# Patient Record
Sex: FEMALE | Race: BLACK OR AFRICAN AMERICAN | NOT HISPANIC OR LATINO | Employment: OTHER | URBAN - METROPOLITAN AREA
[De-identification: names, ages, dates, MRNs, and addresses within clinical notes are randomized per-mention and may not be internally consistent; named-entity substitution may affect disease eponyms.]

---

## 2022-07-21 ENCOUNTER — TRANSFERRED RECORDS (OUTPATIENT)
Dept: HEALTH INFORMATION MANAGEMENT | Facility: CLINIC | Age: 67
End: 2022-07-21

## 2022-09-06 ENCOUNTER — MEDICAL CORRESPONDENCE (OUTPATIENT)
Dept: HEALTH INFORMATION MANAGEMENT | Facility: CLINIC | Age: 67
End: 2022-09-06

## 2022-11-08 RX ORDER — LISINOPRIL 40 MG/1
40 TABLET ORAL DAILY
COMMUNITY

## 2022-11-08 RX ORDER — ALLOPURINOL 100 MG/1
100 TABLET ORAL DAILY
COMMUNITY

## 2022-11-08 RX ORDER — PREGABALIN 75 MG/1
75 CAPSULE ORAL DAILY
COMMUNITY

## 2022-11-08 RX ORDER — CHOLECALCIFEROL (VITAMIN D3) 50 MCG
1 TABLET ORAL DAILY
COMMUNITY

## 2022-11-08 RX ORDER — ATORVASTATIN CALCIUM 20 MG/1
20 TABLET, FILM COATED ORAL AT BEDTIME
COMMUNITY

## 2022-11-08 RX ORDER — AMLODIPINE BESYLATE 5 MG/1
5 TABLET ORAL DAILY
Status: ON HOLD | COMMUNITY
End: 2022-11-24

## 2022-11-08 RX ORDER — TRAZODONE HYDROCHLORIDE 100 MG/1
200 TABLET ORAL AT BEDTIME
COMMUNITY

## 2022-11-08 RX ORDER — METOPROLOL SUCCINATE 100 MG/1
100 TABLET, EXTENDED RELEASE ORAL 2 TIMES DAILY
COMMUNITY

## 2022-11-08 RX ORDER — BUSPIRONE HYDROCHLORIDE 10 MG/1
10 TABLET ORAL 3 TIMES DAILY
COMMUNITY

## 2022-11-08 RX ORDER — HYDROCHLOROTHIAZIDE 12.5 MG/1
12.5 TABLET ORAL DAILY
Status: ON HOLD | COMMUNITY
End: 2022-11-25

## 2022-11-08 RX ORDER — ESCITALOPRAM OXALATE 10 MG/1
10 TABLET ORAL DAILY
COMMUNITY

## 2022-11-14 ENCOUNTER — TRANSFERRED RECORDS (OUTPATIENT)
Dept: MULTI SPECIALTY CLINIC | Facility: CLINIC | Age: 67
End: 2022-11-14

## 2022-11-14 LAB
CREATININE (EXTERNAL): 1.4 MG/DL (ref 0.57–1.11)
GFR ESTIMATED (EXTERNAL): 41 ML/MIN/1.73M2
GLUCOSE (EXTERNAL): 164 MG/DL (ref 65–100)
HBA1C MFR BLD: 8 %
INR (EXTERNAL): 1.1
POTASSIUM (EXTERNAL): 3.8 MMOL/L (ref 3.5–5)

## 2022-11-18 ENCOUNTER — LAB (OUTPATIENT)
Dept: FAMILY MEDICINE | Facility: CLINIC | Age: 67
End: 2022-11-18
Payer: COMMERCIAL

## 2022-11-18 DIAGNOSIS — Z01.812 PRE-OPERATIVE LABORATORY EXAMINATION: ICD-10-CM

## 2022-11-18 LAB — SARS-COV-2 RNA RESP QL NAA+PROBE: NEGATIVE

## 2022-11-18 PROCEDURE — U0003 INFECTIOUS AGENT DETECTION BY NUCLEIC ACID (DNA OR RNA); SEVERE ACUTE RESPIRATORY SYNDROME CORONAVIRUS 2 (SARS-COV-2) (CORONAVIRUS DISEASE [COVID-19]), AMPLIFIED PROBE TECHNIQUE, MAKING USE OF HIGH THROUGHPUT TECHNOLOGIES AS DESCRIBED BY CMS-2020-01-R: HCPCS

## 2022-11-18 PROCEDURE — U0005 INFEC AGEN DETEC AMPLI PROBE: HCPCS

## 2022-11-22 ENCOUNTER — APPOINTMENT (OUTPATIENT)
Dept: GENERAL RADIOLOGY | Facility: CLINIC | Age: 67
DRG: 454 | End: 2022-11-22
Attending: NEUROLOGICAL SURGERY
Payer: COMMERCIAL

## 2022-11-22 ENCOUNTER — ANESTHESIA EVENT (OUTPATIENT)
Dept: SURGERY | Facility: CLINIC | Age: 67
DRG: 454 | End: 2022-11-22
Payer: COMMERCIAL

## 2022-11-22 ENCOUNTER — HOSPITAL ENCOUNTER (INPATIENT)
Facility: CLINIC | Age: 67
LOS: 3 days | Discharge: HOME | DRG: 454 | End: 2022-11-25
Attending: NEUROLOGICAL SURGERY | Admitting: NEUROLOGICAL SURGERY
Payer: COMMERCIAL

## 2022-11-22 ENCOUNTER — ANESTHESIA (OUTPATIENT)
Dept: SURGERY | Facility: CLINIC | Age: 67
DRG: 454 | End: 2022-11-22
Payer: COMMERCIAL

## 2022-11-22 ENCOUNTER — APPOINTMENT (OUTPATIENT)
Dept: PHYSICAL THERAPY | Facility: CLINIC | Age: 67
DRG: 454 | End: 2022-11-22
Attending: NEUROLOGICAL SURGERY
Payer: COMMERCIAL

## 2022-11-22 DIAGNOSIS — M43.26 FUSION OF LUMBAR SPINE: Primary | ICD-10-CM

## 2022-11-22 PROBLEM — M43.20 FUSION OF SPINE, SITE UNSPECIFIED: Status: ACTIVE | Noted: 2022-11-22

## 2022-11-22 LAB
ANION GAP SERPL CALCULATED.3IONS-SCNC: 10 MMOL/L (ref 7–15)
BUN SERPL-MCNC: 19.8 MG/DL (ref 8–23)
CALCIUM SERPL-MCNC: 8.4 MG/DL (ref 8.8–10.2)
CHLORIDE SERPL-SCNC: 99 MMOL/L (ref 98–107)
CREAT SERPL-MCNC: 1.2 MG/DL (ref 0.51–0.95)
DEPRECATED HCO3 PLAS-SCNC: 27 MMOL/L (ref 22–29)
GFR SERPL CREATININE-BSD FRML MDRD: 49 ML/MIN/1.73M2
GLUCOSE BLDC GLUCOMTR-MCNC: 157 MG/DL (ref 70–99)
GLUCOSE BLDC GLUCOMTR-MCNC: 206 MG/DL (ref 70–99)
GLUCOSE BLDC GLUCOMTR-MCNC: 209 MG/DL (ref 70–99)
GLUCOSE BLDC GLUCOMTR-MCNC: 216 MG/DL (ref 70–99)
GLUCOSE BLDC GLUCOMTR-MCNC: 349 MG/DL (ref 70–99)
GLUCOSE SERPL-MCNC: 215 MG/DL (ref 70–99)
HBA1C MFR BLD: 7.3 %
POTASSIUM SERPL-SCNC: 4.1 MMOL/L (ref 3.4–5.3)
SODIUM SERPL-SCNC: 136 MMOL/L (ref 136–145)

## 2022-11-22 PROCEDURE — 01NB3ZZ RELEASE LUMBAR NERVE, PERCUTANEOUS APPROACH: ICD-10-PCS | Performed by: NEUROLOGICAL SURGERY

## 2022-11-22 PROCEDURE — 36415 COLL VENOUS BLD VENIPUNCTURE: CPT | Performed by: PHYSICIAN ASSISTANT

## 2022-11-22 PROCEDURE — 80048 BASIC METABOLIC PNL TOTAL CA: CPT | Performed by: PHYSICIAN ASSISTANT

## 2022-11-22 PROCEDURE — 0SG1071 FUSION OF 2 OR MORE LUMBAR VERTEBRAL JOINTS WITH AUTOLOGOUS TISSUE SUBSTITUTE, POSTERIOR APPROACH, POSTERIOR COLUMN, OPEN APPROACH: ICD-10-PCS | Performed by: NEUROLOGICAL SURGERY

## 2022-11-22 PROCEDURE — 00NY3ZZ RELEASE LUMBAR SPINAL CORD, PERCUTANEOUS APPROACH: ICD-10-PCS | Performed by: NEUROLOGICAL SURGERY

## 2022-11-22 PROCEDURE — 272N000001 HC OR GENERAL SUPPLY STERILE: Performed by: NEUROLOGICAL SURGERY

## 2022-11-22 PROCEDURE — 0SG13A0 FUSION OF 2 OR MORE LUMBAR VERTEBRAL JOINTS WITH INTERBODY FUSION DEVICE, ANTERIOR APPROACH, ANTERIOR COLUMN, PERCUTANEOUS APPROACH: ICD-10-PCS | Performed by: NEUROLOGICAL SURGERY

## 2022-11-22 PROCEDURE — 370N000017 HC ANESTHESIA TECHNICAL FEE, PER MIN: Performed by: NEUROLOGICAL SURGERY

## 2022-11-22 PROCEDURE — 258N000003 HC RX IP 258 OP 636: Performed by: ANESTHESIOLOGY

## 2022-11-22 PROCEDURE — 120N000001 HC R&B MED SURG/OB

## 2022-11-22 PROCEDURE — 0SB23ZZ EXCISION OF LUMBAR VERTEBRAL DISC, PERCUTANEOUS APPROACH: ICD-10-PCS | Performed by: NEUROLOGICAL SURGERY

## 2022-11-22 PROCEDURE — 250N000013 HC RX MED GY IP 250 OP 250 PS 637: Performed by: PHYSICIAN ASSISTANT

## 2022-11-22 PROCEDURE — 250N000013 HC RX MED GY IP 250 OP 250 PS 637: Performed by: NEUROLOGICAL SURGERY

## 2022-11-22 PROCEDURE — C1713 ANCHOR/SCREW BN/BN,TIS/BN: HCPCS | Performed by: NEUROLOGICAL SURGERY

## 2022-11-22 PROCEDURE — 258N000003 HC RX IP 258 OP 636: Performed by: NEUROLOGICAL SURGERY

## 2022-11-22 PROCEDURE — 272N000002 HC OR SUPPLY OTHER OPNP: Performed by: NEUROLOGICAL SURGERY

## 2022-11-22 PROCEDURE — 250N000009 HC RX 250

## 2022-11-22 PROCEDURE — 250N000011 HC RX IP 250 OP 636: Performed by: NEUROLOGICAL SURGERY

## 2022-11-22 PROCEDURE — 97161 PT EVAL LOW COMPLEX 20 MIN: CPT | Mod: GP

## 2022-11-22 PROCEDURE — 99223 1ST HOSP IP/OBS HIGH 75: CPT | Performed by: PHYSICIAN ASSISTANT

## 2022-11-22 PROCEDURE — 250N000009 HC RX 250: Performed by: NEUROLOGICAL SURGERY

## 2022-11-22 PROCEDURE — 250N000005 HC OR RX SURGIFLO HEMOSTATIC MATRIX 10ML 199102S OPNP: Performed by: NEUROLOGICAL SURGERY

## 2022-11-22 PROCEDURE — 710N000009 HC RECOVERY PHASE 1, LEVEL 1, PER MIN: Performed by: NEUROLOGICAL SURGERY

## 2022-11-22 PROCEDURE — 999N000179 XR SURGERY CARM FLUORO LESS THAN 5 MIN W STILLS: Mod: TC

## 2022-11-22 PROCEDURE — 360N000084 HC SURGERY LEVEL 4 W/ FLUORO, PER MIN: Performed by: NEUROLOGICAL SURGERY

## 2022-11-22 PROCEDURE — 4A11X4G MONITORING OF PERIPHERAL NERVOUS ELECTRICAL ACTIVITY, INTRAOPERATIVE, EXTERNAL APPROACH: ICD-10-PCS | Performed by: NEUROLOGICAL SURGERY

## 2022-11-22 PROCEDURE — 83036 HEMOGLOBIN GLYCOSYLATED A1C: CPT | Performed by: PHYSICIAN ASSISTANT

## 2022-11-22 PROCEDURE — 97530 THERAPEUTIC ACTIVITIES: CPT | Mod: GP

## 2022-11-22 PROCEDURE — 272N000282 HC OR IOM SUPPLIES OPNP: Performed by: NEUROLOGICAL SURGERY

## 2022-11-22 PROCEDURE — 250N000012 HC RX MED GY IP 250 OP 636 PS 637: Performed by: PHYSICIAN ASSISTANT

## 2022-11-22 PROCEDURE — 250N000011 HC RX IP 250 OP 636: Performed by: ANESTHESIOLOGY

## 2022-11-22 PROCEDURE — 0SP004Z REMOVAL OF INTERNAL FIXATION DEVICE FROM LUMBAR VERTEBRAL JOINT, OPEN APPROACH: ICD-10-PCS | Performed by: NEUROLOGICAL SURGERY

## 2022-11-22 PROCEDURE — 999N000141 HC STATISTIC PRE-PROCEDURE NURSING ASSESSMENT: Performed by: NEUROLOGICAL SURGERY

## 2022-11-22 PROCEDURE — 250N000011 HC RX IP 250 OP 636

## 2022-11-22 DEVICE — IMPLANTABLE DEVICE: Type: IMPLANTABLE DEVICE | Site: SPINE LUMBAR | Status: FUNCTIONAL

## 2022-11-22 DEVICE — SET SCREW, STAR TYPE (OPEN TYPE)
Type: IMPLANTABLE DEVICE | Site: SPINE LUMBAR | Status: FUNCTIONAL
Brand: LNK/PATHLOC-L MIS SPINAL SYSTEM

## 2022-11-22 DEVICE — SCREW BN 50MM 7.5MM ST CLS 2 THRD 127MM NS PATHLOC-L SPNE LF: Type: IMPLANTABLE DEVICE | Site: SPINE LUMBAR | Status: FUNCTIONAL

## 2022-11-22 RX ORDER — ONDANSETRON 2 MG/ML
4 INJECTION INTRAMUSCULAR; INTRAVENOUS EVERY 6 HOURS PRN
Status: DISCONTINUED | OUTPATIENT
Start: 2022-11-22 | End: 2022-11-25 | Stop reason: HOSPADM

## 2022-11-22 RX ORDER — FENTANYL CITRATE 50 UG/ML
25 INJECTION, SOLUTION INTRAMUSCULAR; INTRAVENOUS EVERY 5 MIN PRN
Status: DISCONTINUED | OUTPATIENT
Start: 2022-11-22 | End: 2022-11-22 | Stop reason: HOSPADM

## 2022-11-22 RX ORDER — DEXTROSE MONOHYDRATE 25 G/50ML
25-50 INJECTION, SOLUTION INTRAVENOUS
Status: DISCONTINUED | OUTPATIENT
Start: 2022-11-22 | End: 2022-11-25 | Stop reason: HOSPADM

## 2022-11-22 RX ORDER — PROCHLORPERAZINE MALEATE 5 MG
5 TABLET ORAL EVERY 6 HOURS PRN
Status: DISCONTINUED | OUTPATIENT
Start: 2022-11-22 | End: 2022-11-25 | Stop reason: HOSPADM

## 2022-11-22 RX ORDER — GABAPENTIN 100 MG/1
100 CAPSULE ORAL
Status: COMPLETED | OUTPATIENT
Start: 2022-11-22 | End: 2022-11-22

## 2022-11-22 RX ORDER — BUPIVACAINE HYDROCHLORIDE 7.5 MG/ML
INJECTION, SOLUTION EPIDURAL; RETROBULBAR PRN
Status: DISCONTINUED | OUTPATIENT
Start: 2022-11-22 | End: 2022-11-22 | Stop reason: HOSPADM

## 2022-11-22 RX ORDER — ACETAMINOPHEN 325 MG/1
650 TABLET ORAL EVERY 4 HOURS PRN
Status: DISCONTINUED | OUTPATIENT
Start: 2022-11-25 | End: 2022-11-25 | Stop reason: HOSPADM

## 2022-11-22 RX ORDER — CEFAZOLIN SODIUM/WATER 2 G/20 ML
2 SYRINGE (ML) INTRAVENOUS SEE ADMIN INSTRUCTIONS
Status: DISCONTINUED | OUTPATIENT
Start: 2022-11-22 | End: 2022-11-22 | Stop reason: HOSPADM

## 2022-11-22 RX ORDER — ONDANSETRON 2 MG/ML
4 INJECTION INTRAMUSCULAR; INTRAVENOUS EVERY 30 MIN PRN
Status: DISCONTINUED | OUTPATIENT
Start: 2022-11-22 | End: 2022-11-22 | Stop reason: HOSPADM

## 2022-11-22 RX ORDER — CEFAZOLIN SODIUM/WATER 2 G/20 ML
2 SYRINGE (ML) INTRAVENOUS
Status: COMPLETED | OUTPATIENT
Start: 2022-11-22 | End: 2022-11-22

## 2022-11-22 RX ORDER — CEFAZOLIN SODIUM 2 G/100ML
2 INJECTION, SOLUTION INTRAVENOUS EVERY 8 HOURS
Status: COMPLETED | OUTPATIENT
Start: 2022-11-22 | End: 2022-11-23

## 2022-11-22 RX ORDER — ALLOPURINOL 100 MG/1
100 TABLET ORAL DAILY
Status: DISCONTINUED | OUTPATIENT
Start: 2022-11-23 | End: 2022-11-25 | Stop reason: HOSPADM

## 2022-11-22 RX ORDER — DEXAMETHASONE SODIUM PHOSPHATE 4 MG/ML
INJECTION, SOLUTION INTRA-ARTICULAR; INTRALESIONAL; INTRAMUSCULAR; INTRAVENOUS; SOFT TISSUE PRN
Status: DISCONTINUED | OUTPATIENT
Start: 2022-11-22 | End: 2022-11-22

## 2022-11-22 RX ORDER — HYDROMORPHONE HYDROCHLORIDE 1 MG/ML
0.5 INJECTION, SOLUTION INTRAMUSCULAR; INTRAVENOUS; SUBCUTANEOUS
Status: DISCONTINUED | OUTPATIENT
Start: 2022-11-22 | End: 2022-11-25 | Stop reason: HOSPADM

## 2022-11-22 RX ORDER — PROPOFOL 10 MG/ML
INJECTION, EMULSION INTRAVENOUS PRN
Status: DISCONTINUED | OUTPATIENT
Start: 2022-11-22 | End: 2022-11-22

## 2022-11-22 RX ORDER — SODIUM CHLORIDE, SODIUM LACTATE, POTASSIUM CHLORIDE, CALCIUM CHLORIDE 600; 310; 30; 20 MG/100ML; MG/100ML; MG/100ML; MG/100ML
INJECTION, SOLUTION INTRAVENOUS CONTINUOUS
Status: DISCONTINUED | OUTPATIENT
Start: 2022-11-22 | End: 2022-11-22 | Stop reason: HOSPADM

## 2022-11-22 RX ORDER — OXYCODONE HYDROCHLORIDE 5 MG/1
10 TABLET ORAL EVERY 4 HOURS PRN
Status: DISCONTINUED | OUTPATIENT
Start: 2022-11-22 | End: 2022-11-25 | Stop reason: HOSPADM

## 2022-11-22 RX ORDER — LISINOPRIL 40 MG/1
40 TABLET ORAL DAILY
Status: DISCONTINUED | OUTPATIENT
Start: 2022-11-23 | End: 2022-11-24

## 2022-11-22 RX ORDER — BISACODYL 10 MG
10 SUPPOSITORY, RECTAL RECTAL DAILY PRN
Status: DISCONTINUED | OUTPATIENT
Start: 2022-11-22 | End: 2022-11-25 | Stop reason: HOSPADM

## 2022-11-22 RX ORDER — HYDROMORPHONE HCL IN WATER/PF 6 MG/30 ML
0.4 PATIENT CONTROLLED ANALGESIA SYRINGE INTRAVENOUS EVERY 5 MIN PRN
Status: DISCONTINUED | OUTPATIENT
Start: 2022-11-22 | End: 2022-11-22 | Stop reason: HOSPADM

## 2022-11-22 RX ORDER — NALOXONE HYDROCHLORIDE 0.4 MG/ML
0.4 INJECTION, SOLUTION INTRAMUSCULAR; INTRAVENOUS; SUBCUTANEOUS
Status: DISCONTINUED | OUTPATIENT
Start: 2022-11-22 | End: 2022-11-25 | Stop reason: HOSPADM

## 2022-11-22 RX ORDER — ATORVASTATIN CALCIUM 20 MG/1
20 TABLET, FILM COATED ORAL AT BEDTIME
Status: DISCONTINUED | OUTPATIENT
Start: 2022-11-22 | End: 2022-11-25 | Stop reason: HOSPADM

## 2022-11-22 RX ORDER — AMOXICILLIN 250 MG
1 CAPSULE ORAL 2 TIMES DAILY
Status: DISCONTINUED | OUTPATIENT
Start: 2022-11-22 | End: 2022-11-25 | Stop reason: HOSPADM

## 2022-11-22 RX ORDER — FENTANYL CITRATE 50 UG/ML
50 INJECTION, SOLUTION INTRAMUSCULAR; INTRAVENOUS EVERY 5 MIN PRN
Status: DISCONTINUED | OUTPATIENT
Start: 2022-11-22 | End: 2022-11-22 | Stop reason: HOSPADM

## 2022-11-22 RX ORDER — HYDRALAZINE HYDROCHLORIDE 20 MG/ML
INJECTION INTRAMUSCULAR; INTRAVENOUS PRN
Status: DISCONTINUED | OUTPATIENT
Start: 2022-11-22 | End: 2022-11-22

## 2022-11-22 RX ORDER — KETOROLAC TROMETHAMINE 15 MG/ML
15 INJECTION, SOLUTION INTRAMUSCULAR; INTRAVENOUS EVERY 6 HOURS
Status: DISCONTINUED | OUTPATIENT
Start: 2022-11-22 | End: 2022-11-24

## 2022-11-22 RX ORDER — NALOXONE HYDROCHLORIDE 0.4 MG/ML
0.2 INJECTION, SOLUTION INTRAMUSCULAR; INTRAVENOUS; SUBCUTANEOUS
Status: DISCONTINUED | OUTPATIENT
Start: 2022-11-22 | End: 2022-11-25 | Stop reason: HOSPADM

## 2022-11-22 RX ORDER — LIDOCAINE HYDROCHLORIDE 10 MG/ML
INJECTION, SOLUTION INFILTRATION; PERINEURAL PRN
Status: DISCONTINUED | OUTPATIENT
Start: 2022-11-22 | End: 2022-11-22

## 2022-11-22 RX ORDER — GLYCOPYRROLATE 0.2 MG/ML
INJECTION, SOLUTION INTRAMUSCULAR; INTRAVENOUS PRN
Status: DISCONTINUED | OUTPATIENT
Start: 2022-11-22 | End: 2022-11-22

## 2022-11-22 RX ORDER — LABETALOL HYDROCHLORIDE 5 MG/ML
10 INJECTION, SOLUTION INTRAVENOUS
Status: DISCONTINUED | OUTPATIENT
Start: 2022-11-22 | End: 2022-11-22 | Stop reason: HOSPADM

## 2022-11-22 RX ORDER — SODIUM CHLORIDE 9 MG/ML
INJECTION, SOLUTION INTRAVENOUS CONTINUOUS
Status: DISCONTINUED | OUTPATIENT
Start: 2022-11-22 | End: 2022-11-23

## 2022-11-22 RX ORDER — CLONIDINE HYDROCHLORIDE 0.1 MG/1
1 TABLET ORAL 3 TIMES DAILY
Status: ON HOLD | COMMUNITY
End: 2022-11-24

## 2022-11-22 RX ORDER — PREGABALIN 75 MG/1
75 CAPSULE ORAL
Status: DISCONTINUED | OUTPATIENT
Start: 2022-11-22 | End: 2022-11-23

## 2022-11-22 RX ORDER — KETAMINE HYDROCHLORIDE 10 MG/ML
INJECTION INTRAMUSCULAR; INTRAVENOUS PRN
Status: DISCONTINUED | OUTPATIENT
Start: 2022-11-22 | End: 2022-11-22

## 2022-11-22 RX ORDER — CLONIDINE HYDROCHLORIDE 0.1 MG/1
0.1 TABLET ORAL 3 TIMES DAILY
Status: DISCONTINUED | OUTPATIENT
Start: 2022-11-22 | End: 2022-11-24

## 2022-11-22 RX ORDER — HYDROMORPHONE HCL IN WATER/PF 6 MG/30 ML
0.2 PATIENT CONTROLLED ANALGESIA SYRINGE INTRAVENOUS EVERY 5 MIN PRN
Status: DISCONTINUED | OUTPATIENT
Start: 2022-11-22 | End: 2022-11-22 | Stop reason: HOSPADM

## 2022-11-22 RX ORDER — POLYETHYLENE GLYCOL 3350 17 G/17G
17 POWDER, FOR SOLUTION ORAL DAILY
Status: DISCONTINUED | OUTPATIENT
Start: 2022-11-23 | End: 2022-11-25 | Stop reason: HOSPADM

## 2022-11-22 RX ORDER — LIDOCAINE 40 MG/G
CREAM TOPICAL
Status: DISCONTINUED | OUTPATIENT
Start: 2022-11-22 | End: 2022-11-22 | Stop reason: HOSPADM

## 2022-11-22 RX ORDER — ACETAMINOPHEN 325 MG/1
975 TABLET ORAL EVERY 8 HOURS
Status: COMPLETED | OUTPATIENT
Start: 2022-11-22 | End: 2022-11-25

## 2022-11-22 RX ORDER — NICOTINE POLACRILEX 4 MG
15-30 LOZENGE BUCCAL
Status: DISCONTINUED | OUTPATIENT
Start: 2022-11-22 | End: 2022-11-25 | Stop reason: HOSPADM

## 2022-11-22 RX ORDER — ONDANSETRON 2 MG/ML
INJECTION INTRAMUSCULAR; INTRAVENOUS PRN
Status: DISCONTINUED | OUTPATIENT
Start: 2022-11-22 | End: 2022-11-22

## 2022-11-22 RX ORDER — HYDROCHLOROTHIAZIDE 12.5 MG/1
12.5 CAPSULE ORAL DAILY
Status: DISCONTINUED | OUTPATIENT
Start: 2022-11-23 | End: 2022-11-24

## 2022-11-22 RX ORDER — TRAZODONE HYDROCHLORIDE 100 MG/1
200 TABLET ORAL AT BEDTIME
Status: DISCONTINUED | OUTPATIENT
Start: 2022-11-22 | End: 2022-11-25 | Stop reason: HOSPADM

## 2022-11-22 RX ORDER — ONDANSETRON 4 MG/1
4 TABLET, ORALLY DISINTEGRATING ORAL EVERY 6 HOURS PRN
Status: DISCONTINUED | OUTPATIENT
Start: 2022-11-22 | End: 2022-11-25 | Stop reason: HOSPADM

## 2022-11-22 RX ORDER — OXYCODONE HYDROCHLORIDE 5 MG/1
5 TABLET ORAL EVERY 4 HOURS PRN
Status: DISCONTINUED | OUTPATIENT
Start: 2022-11-22 | End: 2022-11-25 | Stop reason: HOSPADM

## 2022-11-22 RX ORDER — HYDROXYZINE HYDROCHLORIDE 10 MG/1
10 TABLET, FILM COATED ORAL EVERY 6 HOURS
Qty: 60 TABLET | Refills: 3 | Status: SHIPPED | OUTPATIENT
Start: 2022-11-22

## 2022-11-22 RX ORDER — LIDOCAINE 40 MG/G
CREAM TOPICAL
Status: DISCONTINUED | OUTPATIENT
Start: 2022-11-22 | End: 2022-11-25 | Stop reason: HOSPADM

## 2022-11-22 RX ORDER — METHOCARBAMOL 750 MG/1
750 TABLET, FILM COATED ORAL EVERY 6 HOURS PRN
Status: DISCONTINUED | OUTPATIENT
Start: 2022-11-22 | End: 2022-11-25 | Stop reason: HOSPADM

## 2022-11-22 RX ORDER — AMLODIPINE BESYLATE 5 MG/1
5 TABLET ORAL DAILY
Status: DISCONTINUED | OUTPATIENT
Start: 2022-11-23 | End: 2022-11-24

## 2022-11-22 RX ORDER — OXYCODONE HYDROCHLORIDE 10 MG/1
10 TABLET ORAL EVERY 4 HOURS PRN
Qty: 50 TABLET | Refills: 0 | Status: SHIPPED | OUTPATIENT
Start: 2022-11-22

## 2022-11-22 RX ORDER — METOPROLOL SUCCINATE 100 MG/1
100 TABLET, EXTENDED RELEASE ORAL 2 TIMES DAILY
Status: DISCONTINUED | OUTPATIENT
Start: 2022-11-22 | End: 2022-11-25 | Stop reason: HOSPADM

## 2022-11-22 RX ORDER — HYDROMORPHONE HCL IN WATER/PF 6 MG/30 ML
0.2 PATIENT CONTROLLED ANALGESIA SYRINGE INTRAVENOUS
Status: DISCONTINUED | OUTPATIENT
Start: 2022-11-22 | End: 2022-11-25 | Stop reason: HOSPADM

## 2022-11-22 RX ORDER — METHOCARBAMOL 500 MG/1
500 TABLET, FILM COATED ORAL 4 TIMES DAILY
Status: DISCONTINUED | OUTPATIENT
Start: 2022-11-22 | End: 2022-11-23

## 2022-11-22 RX ORDER — DEXMEDETOMIDINE HYDROCHLORIDE 4 UG/ML
INJECTION, SOLUTION INTRAVENOUS CONTINUOUS PRN
Status: DISCONTINUED | OUTPATIENT
Start: 2022-11-22 | End: 2022-11-22

## 2022-11-22 RX ORDER — BUSPIRONE HYDROCHLORIDE 10 MG/1
10 TABLET ORAL 3 TIMES DAILY
Status: DISCONTINUED | OUTPATIENT
Start: 2022-11-22 | End: 2022-11-25 | Stop reason: HOSPADM

## 2022-11-22 RX ORDER — HYDROXYZINE HYDROCHLORIDE 10 MG/1
10 TABLET, FILM COATED ORAL EVERY 6 HOURS
Status: DISCONTINUED | OUTPATIENT
Start: 2022-11-22 | End: 2022-11-25 | Stop reason: HOSPADM

## 2022-11-22 RX ORDER — FENTANYL CITRATE 50 UG/ML
INJECTION, SOLUTION INTRAMUSCULAR; INTRAVENOUS PRN
Status: DISCONTINUED | OUTPATIENT
Start: 2022-11-22 | End: 2022-11-22

## 2022-11-22 RX ORDER — ONDANSETRON 4 MG/1
4 TABLET, ORALLY DISINTEGRATING ORAL EVERY 30 MIN PRN
Status: DISCONTINUED | OUTPATIENT
Start: 2022-11-22 | End: 2022-11-22 | Stop reason: HOSPADM

## 2022-11-22 RX ORDER — ESCITALOPRAM OXALATE 10 MG/1
10 TABLET ORAL DAILY
Status: DISCONTINUED | OUTPATIENT
Start: 2022-11-23 | End: 2022-11-25 | Stop reason: HOSPADM

## 2022-11-22 RX ADMIN — ATORVASTATIN CALCIUM 20 MG: 20 TABLET, FILM COATED ORAL at 22:15

## 2022-11-22 RX ADMIN — BUSPIRONE HYDROCHLORIDE 10 MG: 10 TABLET ORAL at 20:50

## 2022-11-22 RX ADMIN — HYDROMORPHONE HYDROCHLORIDE 1 MG: 1 INJECTION, SOLUTION INTRAMUSCULAR; INTRAVENOUS; SUBCUTANEOUS at 08:28

## 2022-11-22 RX ADMIN — INSULIN ASPART 2 UNITS: 100 INJECTION, SOLUTION INTRAVENOUS; SUBCUTANEOUS at 17:49

## 2022-11-22 RX ADMIN — GABAPENTIN 100 MG: 100 CAPSULE ORAL at 06:24

## 2022-11-22 RX ADMIN — KETOROLAC TROMETHAMINE 15 MG: 15 INJECTION, SOLUTION INTRAMUSCULAR; INTRAVENOUS at 17:15

## 2022-11-22 RX ADMIN — Medication 80 MG: at 07:48

## 2022-11-22 RX ADMIN — ACETAMINOPHEN 975 MG: 325 TABLET, FILM COATED ORAL at 11:28

## 2022-11-22 RX ADMIN — Medication 50 MG: at 08:15

## 2022-11-22 RX ADMIN — FENTANYL CITRATE 50 MCG: 50 INJECTION, SOLUTION INTRAMUSCULAR; INTRAVENOUS at 11:36

## 2022-11-22 RX ADMIN — HYDRALAZINE HYDROCHLORIDE 5 MG: 20 INJECTION INTRAMUSCULAR; INTRAVENOUS at 09:06

## 2022-11-22 RX ADMIN — PROPOFOL 50 MCG/KG/MIN: 10 INJECTION, EMULSION INTRAVENOUS at 08:15

## 2022-11-22 RX ADMIN — FENTANYL CITRATE 50 MCG: 50 INJECTION, SOLUTION INTRAMUSCULAR; INTRAVENOUS at 08:35

## 2022-11-22 RX ADMIN — FENTANYL CITRATE 100 MCG: 50 INJECTION, SOLUTION INTRAMUSCULAR; INTRAVENOUS at 07:48

## 2022-11-22 RX ADMIN — ACETAMINOPHEN 975 MG: 325 TABLET, FILM COATED ORAL at 19:19

## 2022-11-22 RX ADMIN — OXYCODONE HYDROCHLORIDE 5 MG: 5 TABLET ORAL at 11:47

## 2022-11-22 RX ADMIN — CLONIDINE HYDROCHLORIDE 0.1 MG: 0.1 TABLET ORAL at 20:50

## 2022-11-22 RX ADMIN — CEFAZOLIN SODIUM 2 G: 2 INJECTION, SOLUTION INTRAVENOUS at 17:15

## 2022-11-22 RX ADMIN — HYDROMORPHONE HYDROCHLORIDE 0.2 MG: 0.2 INJECTION, SOLUTION INTRAMUSCULAR; INTRAVENOUS; SUBCUTANEOUS at 12:48

## 2022-11-22 RX ADMIN — FENTANYL CITRATE 25 MCG: 50 INJECTION, SOLUTION INTRAMUSCULAR; INTRAVENOUS at 11:54

## 2022-11-22 RX ADMIN — Medication 2 G: at 07:39

## 2022-11-22 RX ADMIN — DEXAMETHASONE SODIUM PHOSPHATE 8 MG: 4 INJECTION, SOLUTION INTRA-ARTICULAR; INTRALESIONAL; INTRAMUSCULAR; INTRAVENOUS; SOFT TISSUE at 07:53

## 2022-11-22 RX ADMIN — GLYCOPYRROLATE 0.2 MG: 0.2 INJECTION, SOLUTION INTRAMUSCULAR; INTRAVENOUS at 08:00

## 2022-11-22 RX ADMIN — TRAZODONE HYDROCHLORIDE 200 MG: 100 TABLET ORAL at 22:15

## 2022-11-22 RX ADMIN — KETOROLAC TROMETHAMINE 15 MG: 15 INJECTION, SOLUTION INTRAMUSCULAR; INTRAVENOUS at 23:00

## 2022-11-22 RX ADMIN — ONDANSETRON HYDROCHLORIDE 4 MG: 2 INJECTION, SOLUTION INTRAVENOUS at 09:45

## 2022-11-22 RX ADMIN — Medication 0.5 MCG/KG/HR: at 08:15

## 2022-11-22 RX ADMIN — METOPROLOL SUCCINATE 100 MG: 100 TABLET, EXTENDED RELEASE ORAL at 20:50

## 2022-11-22 RX ADMIN — FENTANYL CITRATE 50 MCG: 50 INJECTION, SOLUTION INTRAMUSCULAR; INTRAVENOUS at 10:40

## 2022-11-22 RX ADMIN — FENTANYL CITRATE 25 MCG: 50 INJECTION, SOLUTION INTRAMUSCULAR; INTRAVENOUS at 12:04

## 2022-11-22 RX ADMIN — HYDROXYZINE HYDROCHLORIDE 10 MG: 10 TABLET ORAL at 22:59

## 2022-11-22 RX ADMIN — PROPOFOL 30 MG: 10 INJECTION, EMULSION INTRAVENOUS at 07:53

## 2022-11-22 RX ADMIN — HYDROXYZINE HYDROCHLORIDE 10 MG: 10 TABLET ORAL at 11:47

## 2022-11-22 RX ADMIN — OXYCODONE HYDROCHLORIDE 10 MG: 5 TABLET ORAL at 20:53

## 2022-11-22 RX ADMIN — SODIUM CHLORIDE: 9 INJECTION, SOLUTION INTRAVENOUS at 17:14

## 2022-11-22 RX ADMIN — PROPOFOL 120 MG: 10 INJECTION, EMULSION INTRAVENOUS at 07:48

## 2022-11-22 RX ADMIN — FENTANYL CITRATE 50 MCG: 50 INJECTION, SOLUTION INTRAMUSCULAR; INTRAVENOUS at 09:00

## 2022-11-22 RX ADMIN — PROPOFOL 50 MG: 10 INJECTION, EMULSION INTRAVENOUS at 08:32

## 2022-11-22 RX ADMIN — SENNOSIDES AND DOCUSATE SODIUM 1 TABLET: 50; 8.6 TABLET ORAL at 20:50

## 2022-11-22 RX ADMIN — SODIUM CHLORIDE, POTASSIUM CHLORIDE, SODIUM LACTATE AND CALCIUM CHLORIDE: 600; 310; 30; 20 INJECTION, SOLUTION INTRAVENOUS at 07:39

## 2022-11-22 RX ADMIN — METHOCARBAMOL 500 MG: 500 TABLET ORAL at 19:18

## 2022-11-22 RX ADMIN — FENTANYL CITRATE 50 MCG: 50 INJECTION, SOLUTION INTRAMUSCULAR; INTRAVENOUS at 11:14

## 2022-11-22 RX ADMIN — LIDOCAINE HYDROCHLORIDE 50 MG: 10 INJECTION, SOLUTION INFILTRATION; PERINEURAL at 07:48

## 2022-11-22 RX ADMIN — HYDROXYZINE HYDROCHLORIDE 10 MG: 10 TABLET ORAL at 17:15

## 2022-11-22 RX ADMIN — HYDROMORPHONE HYDROCHLORIDE 0.2 MG: 0.2 INJECTION, SOLUTION INTRAMUSCULAR; INTRAVENOUS; SUBCUTANEOUS at 13:54

## 2022-11-22 ASSESSMENT — ACTIVITIES OF DAILY LIVING (ADL)
ADLS_ACUITY_SCORE: 18
ADLS_ACUITY_SCORE: 22
ADLS_ACUITY_SCORE: 18
ADLS_ACUITY_SCORE: 22
ADLS_ACUITY_SCORE: 22
ADLS_ACUITY_SCORE: 18
ADLS_ACUITY_SCORE: 20

## 2022-11-22 NOTE — CONSULTS
"Hospitalist Consultation      Juliet Granados MRN# [id number]   YOB: 1955 Age: 67 year old   Date of Admission: 11/22/2022     Requesting Physician: Dr. Gallardo   Reason for consult:  Postoperative medical management           Assessment and Plan:   This patient is a 67 year old female with a PMH significant for anxiety, MDD, CKD, anemia, DM2, chronic pain disorder, morbid obesity, mild stenosis of lumbar region s/p previous fusion (2015), and hypertension who is POD 0 s/p L2-L4 interbody and posterolateral fusion, minimally invasive.     #S/p L2-L4 interbody and posterolateral fusion, minimally invasive   - pain team consulted for assistance with pain management   - cont PT/OT and pain control as per primary    #HTN: BP intermittently elevated  - continue PT Amlodipine, Hydrochlorothiazide, Clonidine, Lisinopril, and Metoprolol with parameters    #DM2: BG in the 150-200s, per patient most recent HgbA1c was in 7 range   - add on HgbA1c  - resume PTA Metformin   - medium sliding scale insulin     #CKD: unclear baseline creatinine due to no previous records to review  - avoid nephrotoxic agents  - check BMP    #MDD  #Anxiety: continue PTA Buspar and Trazodone     #Chronic pain syndrome: previously on Lyrica but did not   -outpatient f/u with PCP for ongoing management    #Gout: resume allopurinol      DVT Prophylaxis: on PCDs as per primary  D/C planning: To home with assistance from children     Clinically Significant Risk Factors Present on Admission                  # Hypertension: home medication list includes antihypertensive(s)     # Obesity: Estimated body mass index is 33.45 kg/m  as calculated from the following:    Height as of this encounter: 1.664 m (5' 5.5\").    Weight as of this encounter: 92.6 kg (204 lb 1.6 oz).         Kimberly Blankenship PA-C  [facility]  Securely message with the Vocera Web Console (learn more here)  Text page via [employer] Paging/Directory  I discussed " the patient with Dr. Orellana and he agrees with the above plan.              History of Present Illness:   This patient is a 67 year old female who is POD 0 s/p L2-L4 interbody and posterolateral fusion, minimally invasive. Intra-op report reviewed and showed no intra-op complications.   I/o's reviewed, currently net +690 ml with good UOP since OR.    Since surgery she has been doing well, no complaints, VSS. Currently tolerating clear liquids and planning to order regular diet and pain currently 5/10. Denies numbness/tingling into bilateral LE, chest pain, shortness of breath, nausea, vomiting, lightheadedness, or dizziness. She has a diamond catheter in place. She has passed flatus. O/w other medical problems have been stable, with no recent c/o illness.                 Past Medical History:     Past Medical History:   Diagnosis Date     Arthritis     generalized     CKD stage 4     related to DM 2     Diabetes (H)     Type 2     High cholesterol      Hypertension           Past Surgical History:     Past Surgical History:   Procedure Laterality Date     AS REPAIR OF HAMMERTOE,ONE Left     second, third and fourth toes     BACK SURGERY      x 2 Laminectomy, discectomy     BUNIONECTOMY Bilateral      ENT SURGERY      tonsillectomy     GYN SURGERY      Laparoscopic hysterectomy     ORTHOPEDIC SURGERY Bilateral     rotator cuff repair            Social History:     Social History     Tobacco Use     Smoking status: Never     Smokeless tobacco: Never   Substance Use Topics     Alcohol use: Yes     Comment: Occas     Drug use: Yes     Types: Marijuana     Comment: Marijuana for pain          Family History:   Family history fully reviewed with patient and noncontributory.           Allergies:   No Known Allergies          Medications:     Prior to Admission medications    Medication Sig Last Dose Taking? Auth Provider Long Term End Date   allopurinol (ZYLOPRIM) 100 MG tablet Take 100 mg by mouth daily 11/22/2022 at 0400  Yes Reported, Patient     amLODIPine (NORVASC) 5 MG tablet Take 5 mg by mouth daily 11/22/2022 at 0400 Yes Reported, Patient Yes    atorvastatin (LIPITOR) 20 MG tablet Take 20 mg by mouth At Bedtime  Yes Reported, Patient Yes    busPIRone (BUSPAR) 10 MG tablet Take 10 mg by mouth 3 times daily 11/22/2022 at 0400 Yes Reported, Patient Yes    cloNIDine (CATAPRES) 0.1 MG tablet Take 1 tablet by mouth 3 times daily 11/22/2022 at 0400 Yes Reported, Patient Yes    escitalopram (LEXAPRO) 10 MG tablet Take 10 mg by mouth daily 11/22/2022 at 0400 Yes Reported, Patient Yes    hydrochlorothiazide (HYDRODIURIL) 12.5 MG tablet Take 12.5 mg by mouth daily 11/22/2022 at 0400 Yes Reported, Patient Yes    hydrOXYzine (ATARAX) 10 MG tablet Take 1 tablet (10 mg) by mouth every 6 hours  Yes Maricruz Gallardo MD     lisinopril (ZESTRIL) 40 MG tablet Take 40 mg by mouth daily 11/22/2022 at 0400 Yes Reported, Patient Yes    metFORMIN (GLUCOPHAGE) 500 MG tablet Take 500 mg by mouth 2 times daily (with meals) 11/21/2022 at 1700 Yes Reported, Patient Yes    metoprolol succinate ER (TOPROL XL) 100 MG 24 hr tablet Take 100 mg by mouth 2 times daily 11/22/2022 at 0400 Yes Reported, Patient Yes    oxyCODONE (ROXICODONE) 10 MG tablet Take 1 tablet (10 mg) by mouth every 4 hours as needed for severe pain (7-10) or moderate to severe pain  Yes Maricruz Gallardo MD     pregabalin (LYRICA) 75 MG capsule Take 75 mg by mouth daily 11/21/2022 at 1600 Yes Reported, Patient Yes    tiZANidine (ZANAFLEX) 4 MG tablet Take 1 tablet (4 mg) by mouth 3 times daily  Yes Maricruz Gallardo MD     traZODone (DESYREL) 100 MG tablet Take 200 mg by mouth At Bedtime 11/21/2022 at 2100 Yes Reported, Patient Yes    vitamin D3 (CHOLECALCIFEROL) 50 mcg (2000 units) tablet Take 1 tablet by mouth daily 11/22/2022 at 0400 Yes Reported, Patient                 Review of Systems:     A comprehensive greater than 10 system review of systems was carried out.  Pertinent positives and  "negatives are noted above.  Otherwise negative for contributory info.            Physical Exam:   Vitals were reviewed  Blood pressure 123/87, pulse 86, temperature 97.4  F (36.3  C), temperature source Temporal, resp. rate 15, height 1.664 m (5' 5.5\"), weight 92.6 kg (204 lb 1.6 oz), SpO2 96 %.  Exam:    GENERAL:  Comfortable.  PSYCH: pleasant, oriented, No acute distress.  HEENT:  PERRLA. Normal conjunctiva, normal hearing, nasal mucosa and oropharynx are normal.  NECK:  Supple, no neck vein distention, adenopathy or bruits, normal thyroid.  HEART:  Normal S1, S2 with no murmur, no pericardial rub, S3 or S4.  LUNGS:  Clear to auscultation, normal respiratory effort.  ABDOMEN:  Soft, no hepatosplenomegaly, normal bowel sounds.  EXTREMITIES:  No pedal edema, +2 pulses bilateral and equal.  BACK: lumbar dressing c/d/i  SKIN:  Dry to touch, No rash, wound or ulcerations.  NEUROLOGIC:  Nonfocal with normal cranial nerve and motor power and sensation.          Data:   Past 24 hours labs, studies, and imaging were reviewed.    Results for orders placed or performed during the hospital encounter of 11/22/22   XR Surgery RAMEZ L/T 5 Min Fluoro w Stills     Status: None    Narrative    This exam was marked as non-reportable because it will not be read by a   radiologist or a [facility] non-radiologist provider.         Glucose by meter     Status: Abnormal   Result Value Ref Range    GLUCOSE BY METER POCT 157 (H) 70 - 99 mg/dL   Glucose by meter     Status: Abnormal   Result Value Ref Range    GLUCOSE BY METER POCT 209 (H) 70 - 99 mg/dL   Glucose by meter     Status: Abnormal   Result Value Ref Range    GLUCOSE BY METER POCT 216 (H) 70 - 99 mg/dL           "

## 2022-11-22 NOTE — DISCHARGE SUMMARY
Discharge Summary    Attending Physician:  Maricruz Gallardo MD  Admit Date: 11/22/2022    Discharge Date: 11/25/22  Primary Care Physician: Domingo Lopez    Discharge Diagnoses  [unfilled]    Discharge Exam    AAOx3 PRETTY f/c all for , no weakness, No new sensory deficit, incision C/D/I        Preliminary Discharge Medications    This list of medications is preliminary and tentative.  Please see the After Visit Summary for the final and accurate medication list.    [unfilled]    Procedures Performed and Findings  Procedure(s):  LUMBAR TWO TO LUMBAR FOUR INTERBODY AND POSTEROLATERAL FUSION MINIMALLY INVASIVE VERSUS OPEN WITH POSTERIOR LUMBAR TWO TO LUMBAR FOUR INSTRUMENTATION AND LUMBAR THREE TO LUMBAR THREE TO LUMBAR FIVE OPEN ACCESS TO HARDWARE       Consultations Obtained  CARE MANAGEMENT / SOCIAL WORK IP CONSULT  HOSPITALIST IP CONSULT  OCCUPATIONAL THERAPY ADULT IP CONSULT  PHYSICAL THERAPY ADULT IP CONSULT  PAIN MANAGEMENT ADULT IP CONSULT    Code Status   Full Code    Discharge Disposition        Diet on Discharge   Regular    Activity on Discharge   Your activity upon discharge: Ad krishan within following limitations:  No excessive activities   No Bending, Twisting, climbing, Crawling,   No lifting more than 8 lb for 2 weeks, or 15 lb for 2 months or 25 lb for 4 months or 35 lb for 6 months  Brace for riding cars for 4-6 months      Discharge Instructions  Per [facility] instruction : [email or web address]        Follow-Up Scheduled    Follow up in 2 weeks with me or PCP for wound check ( patient's choice) if patients want to go to PCP for wound check, then f/u in my office  In 3 months      Hospital Course   Hospital Course unremarkable , adequate ambulation in due time, pain controlled , cleared by PT/OT, no events         CC Dr Gallardo/[facility]

## 2022-11-22 NOTE — ANESTHESIA CARE TRANSFER NOTE
Patient: Juliet Granados    Procedure: Procedure(s):  LUMBAR TWO TO LUMBAR FOUR INTERBODY AND POSTEROLATERAL FUSION MINIMALLY INVASIVE VERSUS OPEN WITH POSTERIOR LUMBAR TWO TO LUMBAR FOUR INSTRUMENTATION AND LUMBAR THREE TO LUMBAR THREE TO LUMBAR FIVE OPEN ACCESS TO HARDWARE       Diagnosis: Spondylolisthesis of lumbar region [M43.16]  Neuritis or radiculitis due to rupture of lumbar intervertebral disc [M51.16]  Stenosis, spinal, lumbar [M48.061]  Arthrodesis status [Z98.1]  Diagnosis Additional Information: No value filed.    Anesthesia Type:   General     Note:    Oropharynx: oropharynx clear of all foreign objects  Level of Consciousness: drowsy  Oxygen Supplementation: face mask  Level of Supplemental Oxygen (L/min / FiO2): 4  Independent Airway: airway patency satisfactory and stable  Dentition: dentition unchanged  Vital Signs Stable: post-procedure vital signs reviewed and stable  Report to RN Given: handoff report given  Patient transferred to: PACU    Handoff Report: Identifed the Patient, Identified the Reponsible Provider, Reviewed the pertinent medical history, Discussed the surgical course, Reviewed Intra-OP anesthesia mangement and issues during anesthesia, Set expectations for post-procedure period and Allowed opportunity for questions and acknowledgement of understanding      Vitals:  Vitals Value Taken Time   /97 11/22/22 1029   Temp 97  F (36.1  C) 11/22/22 1029   Pulse 69 11/22/22 1031   Resp 12 11/22/22 1031   SpO2 100 % 11/22/22 1031   Vitals shown include unvalidated device data.    Electronically Signed By: SAPPHIRE Juan CRNA  November 22, 2022  10:32 AM

## 2022-11-22 NOTE — ANESTHESIA POSTPROCEDURE EVALUATION
Patient: Juliet Granados    Procedure: Procedure(s):  LUMBAR TWO TO LUMBAR FOUR INTERBODY AND POSTEROLATERAL FUSION MINIMALLY INVASIVE VERSUS OPEN WITH POSTERIOR LUMBAR TWO TO LUMBAR FOUR INSTRUMENTATION AND LUMBAR THREE TO LUMBAR THREE TO LUMBAR FIVE OPEN ACCESS TO HARDWARE       Anesthesia Type:  General    Note:  Disposition: Inpatient   Postop Pain Control: Uneventful            Sign Out: Well controlled pain   PONV: No   Neuro/Psych: Uneventful            Sign Out: Acceptable/Baseline neuro status   Airway/Respiratory: Uneventful            Sign Out: Acceptable/Baseline resp. status   CV/Hemodynamics: Uneventful            Sign Out: Acceptable CV status; No obvious hypovolemia; No obvious fluid overload   Other NRE: NONE   DID A NON-ROUTINE EVENT OCCUR? No           Last vitals:  Vitals Value Taken Time   /79 11/22/22 1200   Temp 97  F (36.1  C) 11/22/22 1029   Pulse 82 11/22/22 1217   Resp 27 11/22/22 1217   SpO2 98 % 11/22/22 1217   Vitals shown include unvalidated device data.    Electronically Signed By: Bree Li MD  November 22, 2022  12:19 PM

## 2022-11-22 NOTE — ANESTHESIA PROCEDURE NOTES
Airway       Patient location during procedure: OR       Procedure Start/Stop Times: 11/22/2022 7:51 AM  Staff -        CRNA: Ene Hayes APRN CRNA       Performed By: CRNA  Consent for Airway        Urgency: elective  Indications and Patient Condition       Indications for airway management: julian-procedural       Induction type:intravenous       Mask difficulty assessment: 1 - vent by mask    Final Airway Details       Final airway type: endotracheal airway       Successful airway: ETT - single  Endotracheal Airway Details        ETT size (mm): 7.0       Cuffed: yes       Successful intubation technique: direct laryngoscopy       DL Blade Type: Greer 2       Grade View of Cords: 3       Adjucts: stylet       Position: Right       Measured from: gums/teeth       Secured at (cm): 21       Bite block used: Soft    Post intubation assessment        Placement verified by: capnometry, equal breath sounds and chest rise        Number of attempts at approach: 1       Number of other approaches attempted: 0       Secured with: plastic tape       Ease of procedure: easy    Medication(s) Administered   Medication Administration Time: 11/22/2022 7:51 AM

## 2022-11-22 NOTE — PROGRESS NOTES
11/22/22 1600   Appointment Info   Signing Clinician's Name / Credentials (PT) Kimberly Houston, PT, DPT   Rehab Comments (PT) Back brace, spinal and lifting precautions       Present no   Living Environment   People in Home child(mack), adult   Current Living Arrangements apartment   Home Accessibility no concerns   Transportation Anticipated health plan transportation   Living Environment Comments Patient lives alone and recently moved into an apartment on the 12th floor.  There is an elevator to access the unit.  Patient plans to have her adult son and daughter assist at discharge.  Patient does not drive and family is unable to assist with transportation, will need health care transport.  Patient sleeps in a standard queen bed.  She has a tub shower with grab bars and her toilet is standard height.   Self-Care   Usual Activity Tolerance moderate   Current Activity Tolerance fair   Regular Exercise No   Equipment Currently Used at Home cane, straight   Fall history within last six months no   Activity/Exercise/Self-Care Comment Patient reports baseline independence with dressing, bathing, and toileting.  Patient was able to ambulate independently around her apartment but longer distance ambulation limited by worsening low back pain.  Patient owns a SEC but would like a FWW for discharge.   General Information   Onset of Illness/Injury or Date of Surgery 11/22/22   Referring Physician Maricruz Gallardo MD   Patient/Family Therapy Goals Statement (PT) Patient plans to discharge home with assistance from her children.   Pertinent History of Current Problem (include personal factors and/or comorbidities that impact the POC) 67 year old female with a PMH significant for anxiety, MDD, CKD, anemia, DM2, chronic pain disorder, morbid obesity, mild stenosis of lumbar region s/p previous fusion (2015), and hypertension who is POD 0 s/p L2-L4 interbody and posterolateral fusion, minimally invasive.    Existing Precautions/Restrictions fall;brace worn when out of bed;spinal;lifting  (No lifting more than 8 lb for 2 weeks, or 15 lb for 2 months or 25 lb for 4 months or 35 lb for 6 months)   Cognition   Affect/Mental Status (Cognition) WFL   Orientation Status (Cognition) oriented x 4   Follows Commands (Cognition) WFL   Pain Assessment   Patient Currently in Pain Yes, see Vital Sign flowsheet  (5/10 low back pain)   Integumentary/Edema   Integumentary/Edema Comments Incision low back   Posture    Posture Forward head position;Protracted shoulders   Range of Motion (ROM)   Range of Motion ROM deficits secondary to surgical procedure   ROM Comment No bending or twisting   Strength (Manual Muscle Testing)   Strength (Manual Muscle Testing) Able to perform R SLR;Able to perform L SLR;Deficits observed during functional mobility   Bed Mobility   Comment, (Bed Mobility) Patient completes supine > sit transfer with SBA, HOB flat and upper extremity support on bed rail.   Transfers   Comment, (Transfers) Patient performs sit <> stand from bed with CGA, educated for safe hand placement.   Gait/Stairs (Locomotion)   Comment, (Gait/Stairs) Patient ambulates 5' with FWW and CGA-minAx1.  Patient demonstrates step-through gait pattern with significantly slowed gait speed and decreased step length.   Balance   Balance Comments Impaired dynamic balance   Sensory Examination   Sensory Perception patient reports no sensory changes   Clinical Impression   Criteria for Skilled Therapeutic Intervention Yes, treatment indicated   PT Diagnosis (PT) Impaired functional mobility   Influenced by the following impairments Low back pain and incision, decreased activity tolerance, generalized weakness and deconditioning, impaired balance   Functional limitations due to impairments Impaired independence with bed mobility, transfers, and gait   Clinical Presentation (PT Evaluation Complexity) Stable/Uncomplicated   Clinical Presentation  Rationale Clinical judgement, PMH, social support   Clinical Decision Making (Complexity) low complexity   Planned Therapy Interventions (PT) balance training;bed mobility training;cryotherapy;gait training;home exercise program;neuromuscular re-education;orthotic fitting/training;patient/family education;postural re-education;strengthening;transfer training;progressive activity/exercise;home program guidelines   Anticipated Equipment Needs at Discharge (PT) walker, rolling   Risk & Benefits of therapy have been explained evaluation/treatment results reviewed;care plan/treatment goals reviewed;risks/benefits reviewed;participants voiced agreement with care plan;participants included;patient   PT Total Evaluation Time   PT Eval, Low Complexity Minutes (40029) 10   Plan of Care Review   Plan of Care Reviewed With patient   Physical Therapy Goals   PT Frequency 2x/day   PT Predicted Duration/Target Date for Goal Attainment 11/25/22   PT Goals Bed Mobility;Transfers;Gait   PT: Bed Mobility Independent;Supine to/from sit;Rolling;Within precautions   PT: Transfers Modified independent;Sit to/from stand;Bed to/from chair;Assistive device;Within precautions   PT: Gait Supervision/stand-by assist;100 feet;Gabriel walker;Within precautions   Interventions   Interventions Quick Adds Therapeutic Activity   Therapeutic Activity   Therapeutic Activities: dynamic activities to improve functional performance Minutes (64826) 30   Symptoms Noted During/After Treatment Fatigue;Increased pain;Dizziness   Treatment Detail/Skilled Intervention PT:  Patient greeted supine in bed, having blood sugar check on arrival.  Patient educated on role of PT in acute care setting.  Patient reporting 5/10 low back pain but agreeble to PT session.  Patient cued for ankle pumps/circumduction and SLR while supine in bed for circulatory and strength benefits.  Patient able to complete supine LE exercises with minimal cuing for speed and technique.  Patient  educated for no spinal bending and twisting, cued for supine > sit transfer.  Patient able to complete supine > sit transfer via logroll with SBA, intermittent verbal cues for movement sequencing.  Patient completes bed mobility with HOB flat per home set-up but needing UE support on bed rail to come to sitting at EOB.  Brace donned with patient seated at EOB.  Patient reporting mild dizziness with supine > sit which resolves within a minute of sitting.  Patient cued for sit <> stand with hands pushing from sitting surface during sit > stand, completes x3 sit <> stands with CGA and cues for upright posture with forward gaze in standing.  Patient ambulates total of ~15' in room with FWW and CGA-Mark due to bilateral LE weakness.  No overt loss of balance noted but patient with slowed speed and decreased step length.  Patient seated in chair for dinner at end of session, call light in reach.  Chair alarm not working, RN notified that patient in chair and IV indicating infusion complete.  Patient instructed to complete seated marches, LAQs, and toe taps for circulatory benefits while seated in chair.  Patient instructed to call nurse to return to bed after dinner.  Low back pain increased from 5/10 to 7/10 with activity.   PT Discharge Planning   PT Plan Bed mob via logroll, progress gait distance   PT Discharge Recommendation (DC Rec) home with assist;home with home care physical therapy   PT Rationale for DC Rec Patient presents below her independent PLOF, currently needing Ax1 for bed mobility, transfers, and 15' ambulation with FWW.  Patient lives alone but plans to have her adult son and daughter assist at discharge.  Patient has an elevator to her 12th floor apartment, does not need to perform stairs.  Anticipate with continue IP PT and medical management of pain, patient will progress to completing bed mobility and transfers with IND/mod I and ambulation with supervision.  Patient owns a SEC but would benefit  from a FWW at discharge. Recommend HH PT to address remaining strength, balance, and endurance deficits at discharge.  Patient and her children don't drive, will need transportation arranged for discharge.   PT Brief overview of current status SBA bed mob, CGA transfers and 15' ambulation with FWW   Total Session Time   Timed Code Treatment Minutes 30   Total Session Time (sum of timed and untimed services) 40

## 2022-11-22 NOTE — OP NOTE
REPORT OF OPERATION  Juliet Granados is a 67 year old old female admitted on 11/22/2022  5:30 AM.  Operative Date:  11/22/2022    PRE-PROCEDURE DIAGNOSIS:  1) L2/3  degenerative disc disease. , radiculopathy, claudication,  stenosis,  Listhesis, Scoliosis  2)Body mass index is 33.45 kg/m . sever  Obesity  3) S/P L3-5 Posterior fusion   POST-PROCEDURE DIAGNOSIS:  1) Same as above  PROCEDURE PERFORMED:  1) L2/3/4/ oblique lateral lumbar interbody fusion with discectomy, preparation of the endplate and placement of a bullet cage packed with calcium triphosphate anterior to the transverse process in modified prone position, with intraoperative biplanar fluoroscopic imaging and electrophysiological monitoring.  2) L2  Posterior minimally invasive pedicle screw placement and posterolateral instrumentation and fusion with intraoperative biplanar fluoroscopic imaging and electrophysiological monitoring.  3) Open exploration of L3-5  previous fusion, removal of avel  Replacement of screws and connection to instrumentation as mentioned above for L2-4  posterolateral and interbody fusion  4) Epidural steroid injection.  5) Transpedicular Bone marrow aspiration  6) Injection of 0.75 marcaine in paravertebral tissue for post op pain management  7) sever obesity Body mass index is 33.45 kg/m . require lobnger retractor ( deep gelpies), and added 20% to time of surgery   Surgeon: Maricruz Gallardo MD    HISTORY: Please refer to my clinic note for full details, but in short, patient is a 67 -year-old female with severe back pain and radiculopathy not responding to usual conservative therapy. Patient was set up for the surgery as mentioned above and was taken to surgery as mentioned above after all risks and benefits were explained.  PROCEDURE:  The patient was taken to surgery. After general anesthesia was applied, SCDs and Kay placed and preoperative antibiotic given, then the patient was positioned on the Jonathon table and Paul frame  in a modified prone position for ease of access from the left side.    Technical difficulty, high. Due to patients  large size, an additional 20% of time was spent positioning, draping, padding the patient, to minimizeradiation exposure time and enable accurate placement of instrumentation.    AP and lateral fluoroscopic images are positioned. Patient has been prepped and draped in sterile fashion. The landmarks, including Spinal process, transverse process, disk space, endplates and pedicels are identified and marked.  A Jamshidi needle is placed in the upper right pedicle inside of the vertebral body and bone marrow has been aspirated to be mixed with biologics to introduce Stem cells to the biologics.  Following steps are then taken for levels:  L2/3  Cage size 11 mm high and 30 mm long Titanium  L3/4  Cage size 10 mm high and 30 mm long Titanium    The patient was turned using the rotation of the surgical table so that a near direct anterior-lateral approach to the lumbar spine could be achieved. A small  incision was then made superior to the mid iliac crest and then using biplanar fluoroscopic visualization, under electrophysiological monitoring and stimulation, we introduced an electrophysiological probe through the retroperitoneal space into the desired discs anterior to the transverse processes and then passed it into the disc space after finding a silent window. The sleeve is retained and the probe is removed, then a K wire is passed sequentially into the disk space. A dilating tube was then passed along this same route. Following this, a working channel was then passed sequentially into the disc spaces. The working channel was manually held in position while a series of disc cleaning tools was passed through the channel to remove the affected discs under clear and direct biplanar fluoroscopic visualization, decompress the nerve roots, and decorticate the vertebral endplates at those segments. Physiologic  decompression of the spinal canal, lateral recess and foramen are achieved by restoring the anatomical intradiscal space with increasing the interpedicular space by interbody graft.    Arthrodesis of the intervertebral spaces via an anterior retroperitoneal exposure and application of an intervertebral biomechanical device was then accomplished by using the working channel that had been placed in the retroperitoneal space anterior to the transverse processes. After adequate decompression and preparation of the endplates, we then put calcium triphosphate soaked in bone marrow into the anterior disc space. The working channel was then removed.Then a PEEK interbody was packed tightly with allograft bone for stabilization and arthrodesis of the intervertebral spaces and inserted into the mid portion of the intervertebral discs over a K-Wire under biplanar fluoroscopic visualization and free run EMG s . All bones were confined to the borders of the disc space.     Following steps are then taken for levels:    L2 7.5mm Screw size Right 50 Left 50    Then the patient is rotated for a true prone position. Then entry point for the pedicles is identified in the AP and lateral view, and then skin incision has been injected with local anesthetic. Then we entered the pedicle with a Jamshidi needle. Over the Jamshidi needle, we introduced the K wire in the Vertebral body. Additionally we use a small periosteal elevator along the screws to refresh the surface of the bone and facet and put Calcium-triphosphate soaked in bone marrow for additional posterolateral fusion. Over the K wire then , we dilate the muscle with the dilator and then put a pedicle screws bilaterally. Screws are all silent up to  25 MA of stimulation.  At this time we remove AP c-arm and after injecting Midline  bilateral Mini Axel incision with local anesthetic we open it with scalpel and put retractors in and go down and identify the previous hardware in  L3-5 , Then remove the caps and the avel,   following screws are replaced, and afterward the surface of the bone refreshed and biologic placed for fusion.    L3  with 7.5mm 50 mm long  high top  L4  with 7.5mm 50 mm long  high top  l5 REMOVED     Then we pass a long avel from the open area and pass through MIS screws sothe avel passes through the new/MIS  and previous screws.  In MIS area each incision has been closed with 2-0 Vicryl suture and then in open area we irrigate the incision with abx solution then Put a medium Hemovac, then close the skin with 0-vicryl and skin with 2-0 vicryl And then Skin has been stapled  Before the end of the surgery, we injected 40mg Kenalog and 1 cc 0.25% Marcaine for epidural steroid injection in epidural space under fluoroscopic imaging after we introduced the spinal needle and confirmed with injecting 2cc air and aspiration which confirms we are in the epidural space and no CSF is returned.  Before closing skin we inject 18 cc 0.75% marcaine in paravertebral tissue for post op pain management.     Additional finding: Patients Body mass index is 33.45 kg/m . severe obesity  made this surgery throughout more complex and added 20% more time to the surgery. Additionally, obtaining and interpreting x-ray images was made exponentially more difficult due to the patients substantially higher BMI.    Estimated blood: 360cc.    DISPOSITION: To PACU with postoperative antibiotics. All counts are correct at the end of the surgery.  Maricruz Gallardo MD  cc: Maricruz Gallardo MD

## 2022-11-22 NOTE — PROGRESS NOTES
Dr. Li was notified of the 1309 blood glucose of 216. Dr. Li okay with floor treating since patient was ready to move to room.    Floor RN Alysa was updated and acknowledged this.    Jami Oleary RN on 11/22/2022 at 2:44 PM

## 2022-11-22 NOTE — PROGRESS NOTES
Dr Alejandro notified of moderate drainage on dressing in PACU. Dressing reinforced. Per Dr. Alejandro ok to change dressing if it becomes saturated. Will continue to monitor.

## 2022-11-22 NOTE — PROGRESS NOTES
Félix seen and examined, félix thinks the stimulator is not working,  Today we are not planshe interiano rmove the stimulator if it is not in the approach of surgery but we very clearly discussed that if stimuaort is in the way of the surgery it will be removed

## 2022-11-22 NOTE — PROGRESS NOTES
All dressings removed per dr Alejandro's orders.   Dressings saturated.  Right drain had disconnected from hemovac.   Same cleaned and connected and there is now suction.  Dr Alejandro updated via text.

## 2022-11-22 NOTE — PROGRESS NOTES
Notified MD at 1040 AM regarding .      Spoke with: Dr Li    Orders were not obtained.    Comments: no new orders at this time

## 2022-11-22 NOTE — ANESTHESIA PREPROCEDURE EVALUATION
Anesthesia Pre-Procedure Evaluation    Patient: Juliet Granados   MRN: [id number] : 1955        Procedure : Procedure(s):  LUMBAR TWO TO LUMBAR FOUR INTERBODY AND POSTEROLATERAL FUSION MINIMALLY INVASIVE VERSUS OPEN WITH POSTERIOR LUMBAR TWO TO LUMBAR FOUR INSTRUMENTATION AND LUMBAR THREE TO LUMBAR THREE TO LUMBAR FIVE OPEN ACCESS TO HARDWARE          Past Medical History:   Diagnosis Date     Arthritis     generalized     CKD stage 4     related to DM 2     Diabetes (H)     Type 2     High cholesterol      Hypertension       Past Surgical History:   Procedure Laterality Date     AS REPAIR OF HAMMERTOE,ONE Left     second, third and fourth toes     BACK SURGERY      x 2 Laminectomy, discectomy     BUNIONECTOMY Bilateral      ENT SURGERY      tonsillectomy     GYN SURGERY      Laparoscopic hysterectomy     ORTHOPEDIC SURGERY Bilateral     rotator cuff repair      No Known Allergies   Social History     Tobacco Use     Smoking status: Never     Smokeless tobacco: Never   Substance Use Topics     Alcohol use: Yes     Comment: Occas      Wt Readings from Last 1 Encounters:   22 92.6 kg (204 lb 1.6 oz)        Anesthesia Evaluation   Pt has had prior anesthetic. Type: General.    No history of anesthetic complications       ROS/MED HX  ENT/Pulmonary:  - neg pulmonary ROS  (-) asthma, sleep apnea and recent URI   Neurologic:  - neg neurologic ROS     Cardiovascular:     (+) Dyslipidemia hypertension----- (-) irregular heartbeat/palpitations and stent   METS/Exercise Tolerance:     Hematologic:       Musculoskeletal:   (+) arthritis,     GI/Hepatic:  - neg GI/hepatic ROS  (-) GERD   Renal/Genitourinary:     (+) renal disease, type: CRI,     Endo:     (+) type II DM, Obesity ( BMI 33),     Psychiatric/Substance Use:       Infectious Disease:       Malignancy:       Other:      (+) , H/O Chronic Pain,        Physical Exam    Airway        Mallampati: II   TM distance: > 3 FB   Neck ROM: full   Mouth opening: > 3  cm    Respiratory Devices and Support         Dental  no notable dental history         Cardiovascular   cardiovascular exam normal          Pulmonary   pulmonary exam normal                OUTSIDE LABS:  CBC: No results found for: WBC, HGB, HCT, PLT  BMP:   Lab Results   Component Value Date     (H) 11/22/2022     COAGS: No results found for: PTT, INR, FIBR  POC: No results found for: BGM, HCG, HCGS  HEPATIC: No results found for: ALBUMIN, PROTTOTAL, ALT, AST, GGT, ALKPHOS, BILITOTAL, BILIDIRECT, JULIO  OTHER: No results found for: PH, LACT, A1C, BLU, PHOS, MAG, LIPASE, AMYLASE, TSH, T4, T3, CRP, SED    Anesthesia Plan    ASA Status:  2   NPO Status:  NPO Appropriate    Anesthesia Type: General.     - Airway: ETT   Induction: Intravenous.   Maintenance: Balanced.        Consents    Anesthesia Plan(s) and associated risks, benefits, and realistic alternatives discussed. Questions answered and patient/representative(s) expressed understanding.    - Discussed:     - Discussed with:  Patient      - Extended Intubation/Ventilatory Support Discussed: No.      - Patient is DNR/DNI Status: No    Use of blood products discussed: No .     Postoperative Care    Pain management: IV analgesics, Oral pain medications, Multi-modal analgesia.   PONV prophylaxis: Ondansetron (or other 5HT-3), Dexamethasone or Solumedrol     Comments:                Bree Li MD

## 2022-11-22 NOTE — PHARMACY-ADMISSION MEDICATION HISTORY
Medication history and patient interview completed by pharmacy intern/student or pre-admitting RN.  Reviewed by pharmacist, including [employer] dispense records, [facility] Care Everywhere, and chart review.       Pool Tejeda, Pharm.D., BCPS      Status Changed by Time of change   Nurse Christine Waller, RN Tue Nov 8, 2022 11:22 AM       Medications Prior to Admission   Medication Sig Dispense Refill Last Dose     allopurinol (ZYLOPRIM) 100 MG tablet Take 100 mg by mouth daily   11/22/2022 at 0400     amLODIPine (NORVASC) 5 MG tablet Take 5 mg by mouth daily   11/22/2022 at 0400     atorvastatin (LIPITOR) 20 MG tablet Take 20 mg by mouth At Bedtime        busPIRone (BUSPAR) 10 MG tablet Take 10 mg by mouth 3 times daily   11/22/2022 at 0400     cloNIDine (CATAPRES) 0.1 MG tablet Take 1 tablet by mouth 3 times daily   11/22/2022 at 0400     escitalopram (LEXAPRO) 10 MG tablet Take 10 mg by mouth daily   11/22/2022 at 0400     hydrochlorothiazide (HYDRODIURIL) 12.5 MG tablet Take 12.5 mg by mouth daily   11/22/2022 at 0400     lisinopril (ZESTRIL) 40 MG tablet Take 40 mg by mouth daily   11/22/2022 at 0400     metFORMIN (GLUCOPHAGE) 500 MG tablet Take 500 mg by mouth 2 times daily (with meals)   11/21/2022 at 1700     metoprolol succinate ER (TOPROL XL) 100 MG 24 hr tablet Take 100 mg by mouth 2 times daily   11/22/2022 at 0400     pregabalin (LYRICA) 75 MG capsule Take 75 mg by mouth daily   11/21/2022 at 1600     traZODone (DESYREL) 100 MG tablet Take 200 mg by mouth At Bedtime   11/21/2022 at 2100     vitamin D3 (CHOLECALCIFEROL) 50 mcg (2000 units) tablet Take 1 tablet by mouth daily   11/22/2022 at 0400

## 2022-11-23 ENCOUNTER — APPOINTMENT (OUTPATIENT)
Dept: PHYSICAL THERAPY | Facility: CLINIC | Age: 67
DRG: 454 | End: 2022-11-23
Attending: NEUROLOGICAL SURGERY
Payer: COMMERCIAL

## 2022-11-23 ENCOUNTER — APPOINTMENT (OUTPATIENT)
Dept: OCCUPATIONAL THERAPY | Facility: CLINIC | Age: 67
DRG: 454 | End: 2022-11-23
Attending: NEUROLOGICAL SURGERY
Payer: COMMERCIAL

## 2022-11-23 LAB
ANION GAP SERPL CALCULATED.3IONS-SCNC: 9 MMOL/L (ref 7–15)
BASOPHILS # BLD AUTO: 0 10E3/UL (ref 0–0.2)
BASOPHILS NFR BLD AUTO: 0 %
BUN SERPL-MCNC: 19.5 MG/DL (ref 8–23)
CALCIUM SERPL-MCNC: 8.5 MG/DL (ref 8.8–10.2)
CHLORIDE SERPL-SCNC: 101 MMOL/L (ref 98–107)
CREAT SERPL-MCNC: 1.29 MG/DL (ref 0.51–0.95)
DEPRECATED HCO3 PLAS-SCNC: 30 MMOL/L (ref 22–29)
EOSINOPHIL # BLD AUTO: 0 10E3/UL (ref 0–0.7)
EOSINOPHIL NFR BLD AUTO: 0 %
ERYTHROCYTE [DISTWIDTH] IN BLOOD BY AUTOMATED COUNT: 13.4 % (ref 10–15)
GFR SERPL CREATININE-BSD FRML MDRD: 45 ML/MIN/1.73M2
GLUCOSE BLDC GLUCOMTR-MCNC: 142 MG/DL (ref 70–99)
GLUCOSE BLDC GLUCOMTR-MCNC: 157 MG/DL (ref 70–99)
GLUCOSE BLDC GLUCOMTR-MCNC: 169 MG/DL (ref 70–99)
GLUCOSE BLDC GLUCOMTR-MCNC: 170 MG/DL (ref 70–99)
GLUCOSE BLDC GLUCOMTR-MCNC: 220 MG/DL (ref 70–99)
GLUCOSE BLDC GLUCOMTR-MCNC: 294 MG/DL (ref 70–99)
GLUCOSE SERPL-MCNC: 151 MG/DL (ref 70–99)
HCT VFR BLD AUTO: 29 % (ref 35–47)
HGB BLD-MCNC: 8.8 G/DL (ref 11.7–15.7)
IMM GRANULOCYTES # BLD: 0 10E3/UL
IMM GRANULOCYTES NFR BLD: 0 %
LYMPHOCYTES # BLD AUTO: 1.5 10E3/UL (ref 0.8–5.3)
LYMPHOCYTES NFR BLD AUTO: 15 %
MCH RBC QN AUTO: 25.4 PG (ref 26.5–33)
MCHC RBC AUTO-ENTMCNC: 30.3 G/DL (ref 31.5–36.5)
MCV RBC AUTO: 84 FL (ref 78–100)
MONOCYTES # BLD AUTO: 0.9 10E3/UL (ref 0–1.3)
MONOCYTES NFR BLD AUTO: 9 %
NEUTROPHILS # BLD AUTO: 7.7 10E3/UL (ref 1.6–8.3)
NEUTROPHILS NFR BLD AUTO: 76 %
NRBC # BLD AUTO: 0 10E3/UL
NRBC BLD AUTO-RTO: 0 /100
PLATELET # BLD AUTO: 128 10E3/UL (ref 150–450)
POTASSIUM SERPL-SCNC: 3.7 MMOL/L (ref 3.4–5.3)
RBC # BLD AUTO: 3.46 10E6/UL (ref 3.8–5.2)
SODIUM SERPL-SCNC: 140 MMOL/L (ref 136–145)
WBC # BLD AUTO: 10.1 10E3/UL (ref 4–11)

## 2022-11-23 PROCEDURE — 250N000013 HC RX MED GY IP 250 OP 250 PS 637: Performed by: INTERNAL MEDICINE

## 2022-11-23 PROCEDURE — 80048 BASIC METABOLIC PNL TOTAL CA: CPT | Performed by: NEUROLOGICAL SURGERY

## 2022-11-23 PROCEDURE — 250N000013 HC RX MED GY IP 250 OP 250 PS 637: Performed by: PHYSICIAN ASSISTANT

## 2022-11-23 PROCEDURE — 250N000013 HC RX MED GY IP 250 OP 250 PS 637: Performed by: NEUROLOGICAL SURGERY

## 2022-11-23 PROCEDURE — 97116 GAIT TRAINING THERAPY: CPT | Mod: GP | Performed by: PHYSICAL THERAPIST

## 2022-11-23 PROCEDURE — 250N000013 HC RX MED GY IP 250 OP 250 PS 637: Performed by: NURSE PRACTITIONER

## 2022-11-23 PROCEDURE — 99223 1ST HOSP IP/OBS HIGH 75: CPT | Performed by: NURSE PRACTITIONER

## 2022-11-23 PROCEDURE — 85025 COMPLETE CBC W/AUTO DIFF WBC: CPT | Performed by: NEUROLOGICAL SURGERY

## 2022-11-23 PROCEDURE — 97165 OT EVAL LOW COMPLEX 30 MIN: CPT | Mod: GO | Performed by: REHABILITATION PRACTITIONER

## 2022-11-23 PROCEDURE — 120N000001 HC R&B MED SURG/OB

## 2022-11-23 PROCEDURE — 250N000011 HC RX IP 250 OP 636: Performed by: NEUROLOGICAL SURGERY

## 2022-11-23 PROCEDURE — 36415 COLL VENOUS BLD VENIPUNCTURE: CPT | Performed by: NEUROLOGICAL SURGERY

## 2022-11-23 PROCEDURE — 97535 SELF CARE MNGMENT TRAINING: CPT | Mod: GO | Performed by: REHABILITATION PRACTITIONER

## 2022-11-23 PROCEDURE — 97530 THERAPEUTIC ACTIVITIES: CPT | Mod: GP | Performed by: PHYSICAL THERAPIST

## 2022-11-23 PROCEDURE — 99232 SBSQ HOSP IP/OBS MODERATE 35: CPT | Performed by: INTERNAL MEDICINE

## 2022-11-23 RX ORDER — METHOCARBAMOL 750 MG/1
750 TABLET, FILM COATED ORAL 4 TIMES DAILY
Status: DISCONTINUED | OUTPATIENT
Start: 2022-11-23 | End: 2022-11-25 | Stop reason: HOSPADM

## 2022-11-23 RX ORDER — PREGABALIN 25 MG/1
25 CAPSULE ORAL
Status: DISCONTINUED | OUTPATIENT
Start: 2022-11-23 | End: 2022-11-25 | Stop reason: HOSPADM

## 2022-11-23 RX ORDER — METFORMIN HCL 500 MG
1000 TABLET, EXTENDED RELEASE 24 HR ORAL
COMMUNITY

## 2022-11-23 RX ORDER — METFORMIN HCL 500 MG
1000 TABLET, EXTENDED RELEASE 24 HR ORAL
Status: DISCONTINUED | OUTPATIENT
Start: 2022-11-23 | End: 2022-11-24

## 2022-11-23 RX ADMIN — METHOCARBAMOL 500 MG: 500 TABLET ORAL at 08:09

## 2022-11-23 RX ADMIN — ACETAMINOPHEN 975 MG: 325 TABLET, FILM COATED ORAL at 10:40

## 2022-11-23 RX ADMIN — CLONIDINE HYDROCHLORIDE 0.1 MG: 0.1 TABLET ORAL at 08:06

## 2022-11-23 RX ADMIN — ATORVASTATIN CALCIUM 20 MG: 20 TABLET, FILM COATED ORAL at 21:16

## 2022-11-23 RX ADMIN — ACETAMINOPHEN 975 MG: 325 TABLET, FILM COATED ORAL at 02:34

## 2022-11-23 RX ADMIN — ACETAMINOPHEN 975 MG: 325 TABLET, FILM COATED ORAL at 17:45

## 2022-11-23 RX ADMIN — CEFAZOLIN SODIUM 2 G: 2 INJECTION, SOLUTION INTRAVENOUS at 00:53

## 2022-11-23 RX ADMIN — METHOCARBAMOL 750 MG: 750 TABLET ORAL at 21:15

## 2022-11-23 RX ADMIN — KETOROLAC TROMETHAMINE 15 MG: 15 INJECTION, SOLUTION INTRAMUSCULAR; INTRAVENOUS at 22:56

## 2022-11-23 RX ADMIN — TRAZODONE HYDROCHLORIDE 200 MG: 100 TABLET ORAL at 21:16

## 2022-11-23 RX ADMIN — HYDROXYZINE HYDROCHLORIDE 10 MG: 10 TABLET ORAL at 10:42

## 2022-11-23 RX ADMIN — OXYCODONE HYDROCHLORIDE 10 MG: 5 TABLET ORAL at 01:34

## 2022-11-23 RX ADMIN — HYDROXYZINE HYDROCHLORIDE 10 MG: 10 TABLET ORAL at 17:01

## 2022-11-23 RX ADMIN — INSULIN ASPART 1 UNITS: 100 INJECTION, SOLUTION INTRAVENOUS; SUBCUTANEOUS at 17:47

## 2022-11-23 RX ADMIN — INSULIN ASPART 1 UNITS: 100 INJECTION, SOLUTION INTRAVENOUS; SUBCUTANEOUS at 07:39

## 2022-11-23 RX ADMIN — KETOROLAC TROMETHAMINE 15 MG: 15 INJECTION, SOLUTION INTRAMUSCULAR; INTRAVENOUS at 10:41

## 2022-11-23 RX ADMIN — METHOCARBAMOL 750 MG: 750 TABLET ORAL at 12:01

## 2022-11-23 RX ADMIN — SENNOSIDES AND DOCUSATE SODIUM 1 TABLET: 50; 8.6 TABLET ORAL at 08:09

## 2022-11-23 RX ADMIN — OXYCODONE HYDROCHLORIDE 10 MG: 5 TABLET ORAL at 08:11

## 2022-11-23 RX ADMIN — AMLODIPINE BESYLATE 5 MG: 5 TABLET ORAL at 08:10

## 2022-11-23 RX ADMIN — HYDROXYZINE HYDROCHLORIDE 10 MG: 10 TABLET ORAL at 04:11

## 2022-11-23 RX ADMIN — POLYETHYLENE GLYCOL 3350 17 G: 17 POWDER, FOR SOLUTION ORAL at 08:02

## 2022-11-23 RX ADMIN — CLONIDINE HYDROCHLORIDE 0.1 MG: 0.1 TABLET ORAL at 21:15

## 2022-11-23 RX ADMIN — OXYCODONE HYDROCHLORIDE 10 MG: 5 TABLET ORAL at 21:11

## 2022-11-23 RX ADMIN — BUSPIRONE HYDROCHLORIDE 10 MG: 10 TABLET ORAL at 08:10

## 2022-11-23 RX ADMIN — ESCITALOPRAM OXALATE 10 MG: 10 TABLET ORAL at 08:05

## 2022-11-23 RX ADMIN — KETOROLAC TROMETHAMINE 15 MG: 15 INJECTION, SOLUTION INTRAMUSCULAR; INTRAVENOUS at 04:38

## 2022-11-23 RX ADMIN — METFORMIN HYDROCHLORIDE 1000 MG: 500 TABLET, EXTENDED RELEASE ORAL at 17:00

## 2022-11-23 RX ADMIN — ALLOPURINOL 100 MG: 100 TABLET ORAL at 08:05

## 2022-11-23 RX ADMIN — HYDROCHLOROTHIAZIDE 12.5 MG: 12.5 CAPSULE ORAL at 08:06

## 2022-11-23 RX ADMIN — HYDROXYZINE HYDROCHLORIDE 10 MG: 10 TABLET ORAL at 22:56

## 2022-11-23 RX ADMIN — PREGABALIN 25 MG: 25 CAPSULE ORAL at 17:12

## 2022-11-23 RX ADMIN — SENNOSIDES AND DOCUSATE SODIUM 1 TABLET: 50; 8.6 TABLET ORAL at 21:16

## 2022-11-23 RX ADMIN — INSULIN ASPART 4 UNITS: 100 INJECTION, SOLUTION INTRAVENOUS; SUBCUTANEOUS at 12:31

## 2022-11-23 RX ADMIN — BUSPIRONE HYDROCHLORIDE 10 MG: 10 TABLET ORAL at 13:51

## 2022-11-23 RX ADMIN — OXYCODONE HYDROCHLORIDE 10 MG: 5 TABLET ORAL at 16:04

## 2022-11-23 RX ADMIN — LISINOPRIL 40 MG: 40 TABLET ORAL at 08:04

## 2022-11-23 RX ADMIN — OXYCODONE HYDROCHLORIDE 10 MG: 5 TABLET ORAL at 12:01

## 2022-11-23 RX ADMIN — BUSPIRONE HYDROCHLORIDE 10 MG: 10 TABLET ORAL at 21:16

## 2022-11-23 RX ADMIN — KETOROLAC TROMETHAMINE 15 MG: 15 INJECTION, SOLUTION INTRAMUSCULAR; INTRAVENOUS at 17:02

## 2022-11-23 RX ADMIN — METOPROLOL SUCCINATE 100 MG: 100 TABLET, EXTENDED RELEASE ORAL at 08:07

## 2022-11-23 RX ADMIN — METHOCARBAMOL 750 MG: 750 TABLET ORAL at 16:03

## 2022-11-23 RX ADMIN — METOPROLOL SUCCINATE 100 MG: 100 TABLET, EXTENDED RELEASE ORAL at 21:20

## 2022-11-23 ASSESSMENT — ACTIVITIES OF DAILY LIVING (ADL)
ADLS_ACUITY_SCORE: 22

## 2022-11-23 NOTE — PLAN OF CARE
Patient vital signs are at baseline: Yes  Patient able to ambulate as they were prior to admission or with assist devices provided by therapies during their stay:  No,  Reason:  Ax1 with walker and gait belt.  Patient MUST void prior to discharge:  No,  Reason:  Kay catheter in place.  Patient able to tolerate oral intake:  Yes  Pain has adequate pain control using Oral analgesics:  Yes  Does patient have an identified :  Yes  Has goal D/C date and time been discussed with patient:  No,     A&Ox4. 02 - 94% on RA. LS - clear.   Blood sugar - 349. Insulin coverage given.   Kay catheter - 1300cc, clear yellow.   Hemovac - 80ml.  Prn Oxycodone given. IV infusing.

## 2022-11-23 NOTE — PROGRESS NOTES
DAILY PROGRESS NOTE    Juliet Granados is a 67 year old old female admitted on 11/22/2022  5:30 AM.    Subjective  Comfortable no radiculopathy      Objective    AAOx3, PRETTY , f/c 4/4 no weakenss no sensory deficit   Ambulating,     Hemoglobin   Date Value Ref Range Status   11/23/2022 8.8 (L) 11.7 - 15.7 g/dL Final   ]      Impression / Plan       Plan for today:    Patient doing well  Ambulate with help  PT OT, possibly clearance   D/c lobo this am  Full diet  Today  Wean and d/c PCA and transition to PO meds today

## 2022-11-23 NOTE — PHARMACY-ADMISSION MEDICATION HISTORY
Correction to med list - per patient takes both metformin at same time - daily at supper.  Check [employer] - formulation is XR - correction made to pta list

## 2022-11-23 NOTE — PROGRESS NOTES
"[facility]  Hospitalist Progress Note     Assessment & Plan     ASSESSMENT:    67F with hx of NIDDM Type II, HTN, CKD Stage III, and spinal stenosis w/ degenerative disc disease presents for scheduled fusion. Hospitalist service was consulted for medical mngt.    PLAN:    -Spinal Stenosis w/ Degenerative Disc Disease: S/p fusion. Post-op care per primary team    -NIDDM Type II: Home metformin and sliding scale coverage    -Essential HTN: Home medications. Orthostatics today    -CKD Stage III: At baseline Cr    -DVT Prophy: Per primary team    -Disposition: Per primary team      Soren Cota MD    Subjective     Patient seen at bedside. Pain still uncontrolled especially with ambulation. Only takes metformin at night so order changed to reflect this.        Objective   Blood pressure 133/59, pulse 57, temperature 97.9  F (36.6  C), temperature source Temporal, resp. rate 20, height 1.664 m (5' 5.5\"), weight 92.6 kg (204 lb 1.6 oz), SpO2 97 %.    PHYSICAL EXAM  General: In no acute distress  CV: RRR.  Lungs: CTAB. Nl WOB.  Abd: Non-tender.  MSK: Post-op dressing in place over lumbar spine  Ext: No edema.    LABS AND IMAGING: Reviewed and pertinent results discussed in assessment and plan.   "

## 2022-11-23 NOTE — CONSULTS
[facility]  Pain Service Consultation   Text Page    Date of Admission:  11/22/2022    Assessment & Plan   Juliet Granados is a 67 year old female who was admitted on 11/22/2022. I was asked by Dr. Gallardo to see the patient for post operative pain.    PLAN:   1)  Opioids:   - hydromorphone 0.2 - 0.5 mg every 2 hours prn breakthrough pain  - oxycodone 5-10 mg evrey 4 hours prn moderate to severe pain    Opioids Treatment Goal:   -Improvement in function  -Participate in PT  -Post-operative pain management, expected 3-7 days then back on chronic dose    2)Non-opioid multimodal medication therapy  -Topical:Not indicated , avoid use due to open incision  -N-SAIDS:Ketorolac per surgical team  -Muscle Relaxants:Methocarbamol 750 mg four times daily  -Adjuvants:Acetaminophen 975 mg every 8 hours, Pregabalin 25 mg daily in the evening, Hydroxyzine 10 mg every 6 hours scheduled  -Antidepresants/anxiolytics:Hydroxyzine 10 mg every 6 hours as above    3)  Non-medication interventions  Positioning, ICE, Relaxation, Physical therapy     4)  Constipation Prophylaxis  - senna-docusate 1 tablet two times daily  - miralax 1 packet daily    5) Medication Risk reduction strategies   -monitor for sedation   -Capnography per protocol   -narcan for opioid reversal     6)  Pain Education  -Opioid safe use, storage and disposal information included in DC AVS    7)  DC Planning   Discussed goal of Opioid therapy as above with patient and staff  Length of therapy is less than 10 days, opioids may be stopped without taper.  Continued outpatient management of pain per surgical team  Disposition:home  Support systems: daughter and son  Outpatient Referrals: pain clinic  The following risk factors have been identified for unintentional overdose: patient is on multiple sedating medications , patient is > 65 years old, patient is overweight and patient has anxiety, depression or PTSD. Discharge with intra-nasal naloxone if  discharged to home with opioids  >40 mg MME/day.  Plan for education prior to discharge.    ASSESSMENT  1)  Acute on chronic back pain s/p L2 - L4 fusion    2)  Patient with chronic low back pain, on chronic opioid therapy managed by primary care provider and [facility] pain clinic ([facility])  [id number] Daily Morphine Equivalent as dispensed and as reported daily use.  Patient has no expected opioid tolerance.     Patient's opioid use in past 24 hours: 400 mcg IV fentanyl, 1.4 mg IV dilaudid, 25 mg PO oxycodone = 105.5 mg Daily Morphine Equivalent    3)  Risk factors for opioid related harms  -High opioid dose (>50 MME/day)  - Age > 65 years old  -Anxiety/depression    4)  Opioid induced side-effects:  - patient reports no opioid associated side-effects    5)  Other/Related:    -Depression/anxiety - history of PTSD and associated feelings triggered by surgeries.    Time Spent on this Encounter   Total unit/floor time 85 minutes, time consisted of the following, examination of the patient, reviewing the record and completing documentation. >50% of time spent in counseling and coordination of care.  Time spend counseling with patient consisted of the following topics, symptom management.  Time spent in coordination of care with Bedside Nurse Kathya.     SAPPHIRE Palomares CNP  Pain Management and Palliative Care  [facility]  Pgr: [phone]      Reason for Consult   Reason for consult: I was asked by Paulina to evaluate this patient for post operative pain.    Primary Care Physician   Primary Care Physician:Domingo Lopez  Pain Specialist:  History of procedure with [facility] pain clinic (Dr. Alba)    Chief Complaint   Post operative pain, s/p L2 - L4    History is obtained from the patient    History of Present Illness   Juliet Granados is a 67 year old female who presents with chronic back pain and is now s/p L2 - L4 fusion with Dr. Gallardo.      CURRENT PAIN:  Her pain is located in the low back  It is  described as Burning, Penetrating and Sharp  She rates it as ranging between 2/10 and 10/10  The average is 5/10 on a scale of 0-10  Currently it is rated as 2/10  It improves by lying in bed, ice pack, mindfulness  It worsens by sitting/standing with therapy (this morning)  She has been compliant with the recommendations while in the hospital.      PAIN HISTORY:  The pain is mainly located in the low back, right leg - associated weakness  It is described as Aching, Exhausting, Numb and Shooting  Rates it as ranging between 2/10 and 6/10      PAST PAIN TREATMENT:   Medications: Belbuca (historical medication), Lyrica (historical medication), oxycodone (historical medication)  Non-phamacologic modalities:PT, Ice, Rest, cane  Previous interventions/surgeries: SCS placement, previous fusion    [place] Board of Pharmacy Data Base Reviewed:    YES; As expected, no concern for misuse/abuse of controlled medications based on this report.  OPIOID RISK PLDWF=[id number]      Past Medical History   I have reviewed this patient's medical history and updated it with pertinent information if needed.   Past Medical History:   Diagnosis Date     Arthritis     generalized     CKD stage 4     related to DM 2     Diabetes (H)     Type 2     High cholesterol      Hypertension        Past Surgical History   I have reviewed this patient's surgical history and updated it with pertinent information if needed.  Past Surgical History:   Procedure Laterality Date     AS REPAIR OF HAMMERTOE,ONE Left     second, third and fourth toes     BACK SURGERY      x 2 Laminectomy, discectomy     BUNIONECTOMY Bilateral      ENT SURGERY      tonsillectomy     FUSION SPINE POSTERIOR MINIMALLY INVASIVE THREE + LEVELS N/A 11/22/2022    Procedure: L2/3/4/ oblique lateral lumbar interbody fusion with discectomy L2  Posterior minimally invasive pedicle screw  placement and posterolateral instrumentation and fusion  Open exploration of L3-5  previous fusion, removal  of avel  Replacement of screws and connection to instrumentation as mentioned above for L2-4  posterolateral and interbody fusion;  Surgeon: Maricruz Gallardo MD;  Location:      GYN SURGERY      Laparoscopic hysterectomy     ORTHOPEDIC SURGERY Bilateral     rotator cuff repair         Prior to Admission Medications   Prior to Admission Medications   Prescriptions Last Dose Informant Patient Reported? Taking?   allopurinol (ZYLOPRIM) 100 MG tablet 11/22/2022 at 0400  Yes Yes   Sig: Take 100 mg by mouth daily   amLODIPine (NORVASC) 5 MG tablet 11/22/2022 at 0400  Yes Yes   Sig: Take 5 mg by mouth daily   atorvastatin (LIPITOR) 20 MG tablet   Yes Yes   Sig: Take 20 mg by mouth At Bedtime   busPIRone (BUSPAR) 10 MG tablet 11/22/2022 at 0400  Yes Yes   Sig: Take 10 mg by mouth 3 times daily   cloNIDine (CATAPRES) 0.1 MG tablet 11/22/2022 at 0400  Yes Yes   Sig: Take 1 tablet by mouth 3 times daily   escitalopram (LEXAPRO) 10 MG tablet 11/22/2022 at 0400  Yes Yes   Sig: Take 10 mg by mouth daily   hydrochlorothiazide (HYDRODIURIL) 12.5 MG tablet 11/22/2022 at 0400  Yes Yes   Sig: Take 12.5 mg by mouth daily   lisinopril (ZESTRIL) 40 MG tablet 11/22/2022 at 0400  Yes Yes   Sig: Take 40 mg by mouth daily   metFORMIN (GLUCOPHAGE) 500 MG tablet 11/21/2022 at 1700  Yes Yes   Sig: Take 500 mg by mouth 2 times daily (with meals)   metoprolol succinate ER (TOPROL XL) 100 MG 24 hr tablet 11/22/2022 at 0400  Yes Yes   Sig: Take 100 mg by mouth 2 times daily   pregabalin (LYRICA) 75 MG capsule 11/21/2022 at 1600  Yes Yes   Sig: Take 75 mg by mouth daily   traZODone (DESYREL) 100 MG tablet 11/21/2022 at 2100  Yes Yes   Sig: Take 200 mg by mouth At Bedtime   vitamin D3 (CHOLECALCIFEROL) 50 mcg (2000 units) tablet 11/22/2022 at 0400  Yes Yes   Sig: Take 1 tablet by mouth daily      Facility-Administered Medications: None     Allergies   No Known Allergies    Social History   I have reviewed this patient's social history and updated it  with pertinent information if needed. Juliet Granados  reports that she has never smoked. She has never used smokeless tobacco. She reports current alcohol use. She reports current drug use. Drug: Marijuana.    Family History   I have reviewed this patient's family history and updated it with pertinent information if needed.   History reviewed. No pertinent family history.  Family history of addiction no    Review of Systems   The 10 point Review of Systems is negative other than noted in the HPI or here.    Denies Bowel or bladder dysfunction    Physical Exam   Temp:  [96.9  F (36.1  C)-97.9  F (36.6  C)] 97.9  F (36.6  C)  Pulse:  [55-91] 57  Resp:  [8-26] 20  BP: ()/() 137/52  SpO2:  [94 %-100 %] 97 %  204 lbs 1.6 oz  GEN:  Alert, oriented x 3, appears comfortable, No apparent distress.  HEENT:  Normocephalic/atraumatic, no scleral icterus, no nasal discharge, mouth moist.  CV:  RRR, S1, S2; no murmurs or other irregularities noted.  +3 DP/PT pulses bilaterally; no edema bilateral lower extremeties.  RESP:  Clear to auscultation bilaterally without rales/rhonchi/wheezing/retractions.  Symmetric chest rise on inhalation noted.  Normal respiratory effort.  ABD:  Rounded, soft, non-tender/non-distended.  +BS  EXT:  Edema & pulses as noted above.  Color, moisture and sensation intact x 4.     SKIN:  Dry to touch, no exanthems noted in the visualized areas.    NEURO: weakness with right foot dorsiflexion 4/5.  5/5 strength otherwise.  No other focal deficits.  PAIN BEHAVIOR: Cooperative  Psych:  Normal affect.  Calm, cooperative, conversant appropriately.       Data   Results for orders placed or performed during the hospital encounter of 11/22/22 (from the past 24 hour(s))   Glucose by meter   Result Value Ref Range    GLUCOSE BY METER POCT 209 (H) 70 - 99 mg/dL   XR Surgery RAMEZ L/T 5 Min Fluoro w Stills    Narrative    This exam was marked as non-reportable because it will not be read by a   radiologist  or a [place] non-radiologist provider.         Glucose by meter   Result Value Ref Range    GLUCOSE BY METER POCT 216 (H) 70 - 99 mg/dL   Glucose by meter   Result Value Ref Range    GLUCOSE BY METER POCT 206 (H) 70 - 99 mg/dL   Hemoglobin A1c   Result Value Ref Range    Hemoglobin A1C 7.3 (H) <5.7 %   Basic metabolic panel   Result Value Ref Range    Sodium 136 136 - 145 mmol/L    Potassium 4.1 3.4 - 5.3 mmol/L    Chloride 99 98 - 107 mmol/L    Carbon Dioxide (CO2) 27 22 - 29 mmol/L    Anion Gap 10 7 - 15 mmol/L    Urea Nitrogen 19.8 8.0 - 23.0 mg/dL    Creatinine 1.20 (H) 0.51 - 0.95 mg/dL    Calcium 8.4 (L) 8.8 - 10.2 mg/dL    Glucose 215 (H) 70 - 99 mg/dL    GFR Estimate 49 (L) >60 mL/min/1.73m2   Glucose by meter   Result Value Ref Range    GLUCOSE BY METER POCT 349 (H) 70 - 99 mg/dL   Glucose by meter   Result Value Ref Range    GLUCOSE BY METER POCT 220 (H) 70 - 99 mg/dL   CBC with platelets differential    Narrative    The following orders were created for panel order CBC with platelets differential.  Procedure                               Abnormality         Status                     ---------                               -----------         ------                     CBC with platelets and d...[[id number]]  Abnormal            Final result                 Please view results for these tests on the individual orders.   Basic metabolic panel   Result Value Ref Range    Sodium 140 136 - 145 mmol/L    Potassium 3.7 3.4 - 5.3 mmol/L    Chloride 101 98 - 107 mmol/L    Carbon Dioxide (CO2) 30 (H) 22 - 29 mmol/L    Anion Gap 9 7 - 15 mmol/L    Urea Nitrogen 19.5 8.0 - 23.0 mg/dL    Creatinine 1.29 (H) 0.51 - 0.95 mg/dL    Calcium 8.5 (L) 8.8 - 10.2 mg/dL    Glucose 151 (H) 70 - 99 mg/dL    GFR Estimate 45 (L) >60 mL/min/1.73m2   CBC with platelets and differential   Result Value Ref Range    WBC Count 10.1 4.0 - 11.0 10e3/uL    RBC Count 3.46 (L) 3.80 - 5.20 10e6/uL    Hemoglobin 8.8 (L) 11.7 - 15.7 g/dL     Hematocrit 29.0 (L) 35.0 - 47.0 %    MCV 84 78 - 100 fL    MCH 25.4 (L) 26.5 - 33.0 pg    MCHC 30.3 (L) 31.5 - 36.5 g/dL    RDW 13.4 10.0 - 15.0 %    Platelet Count 128 (L) 150 - 450 10e3/uL    % Neutrophils 76 %    % Lymphocytes 15 %    % Monocytes 9 %    % Eosinophils 0 %    % Basophils 0 %    % Immature Granulocytes 0 %    NRBCs per 100 WBC 0 <1 /100    Absolute Neutrophils 7.7 1.6 - 8.3 10e3/uL    Absolute Lymphocytes 1.5 0.8 - 5.3 10e3/uL    Absolute Monocytes 0.9 0.0 - 1.3 10e3/uL    Absolute Eosinophils 0.0 0.0 - 0.7 10e3/uL    Absolute Basophils 0.0 0.0 - 0.2 10e3/uL    Absolute Immature Granulocytes 0.0 <=0.4 10e3/uL    Absolute NRBCs 0.0 10e3/uL   Glucose by meter   Result Value Ref Range    GLUCOSE BY METER POCT 157 (H) 70 - 99 mg/dL   Glucose by meter   Result Value Ref Range    GLUCOSE BY METER POCT 142 (H) 70 - 99 mg/dL

## 2022-11-23 NOTE — PLAN OF CARE
Goal Outcome Evaluation:      Plan of Care Reviewed With: patient    Patient vital signs are at baseline: Yes afebrile RA  Patient able to ambulate as they were prior to admission or with assist devices provided by therapies during their stay:  Yes up with assist of 1 gait belt and walker with her brace also. She tolerated walking to the recliner and eating dinner.   Patient MUST void prior to discharge:  Yes Kay in place. Will be discontinue in the POD 1  Patient able to tolerate oral intake:  No,  Reason:  yes  Pain has adequate pain control using Oral analgesics:  Yes  Does patient have an identified :  Yes Son  Has goal D/C date and time been discussed with patient:  Yes pt plans to discharge home with son 1-2 days.    ML. Hemovac in place. Kay patent with adequate a mount out. Cms intact. . IV Ancef. POC reviewed with pt will continue to monitor

## 2022-11-23 NOTE — PROGRESS NOTES
11/23/22 2817   Appointment Info   Signing Clinician's Name / Credentials (OT) Migdalia Tamayo OTR/L   Living Environment   People in Home alone;child(mack), adult  (dtr has been staying with patient)   Current Living Arrangements apartment   Transportation Anticipated health plan transportation   Living Environment Comments Patient lives alone and recently moved into an apartment on the 12th floor.  There is an elevator to access the unit.  Patient plans to have her adult son and daughter assist at discharge.  Patient does not drive and family is unable to assist with transportation, will need health care transport.  Patient sleeps in a standard queen bed.  She has a tub shower with grab bars and her toilet is standard height.   Self-Care   Usual Activity Tolerance moderate   Current Activity Tolerance fair   Equipment Currently Used at Home cane, straight   Fall history within last six months no   Activity/Exercise/Self-Care Comment Patient reports baseline independence with dressing, bathing, and toileting.  Patient was able to ambulate independently around her apartment but longer distance ambulation limited by worsening low back pain.  Patient owns a SEC but would like a FWW for discharge.   Instrumental Activities of Daily Living (IADL)   IADL Comments Patient's children will A as needed   General Information   Onset of Illness/Injury or Date of Surgery 11/22/22   Referring Physician Dr. Gallardo   Patient/Family Therapy Goal Statement (OT) to return home with A of children   Additional Occupational Profile Info/Pertinent History of Current Problem 67 year old female with a PMH significant for anxiety, MDD, CKD, anemia, DM2, chronic pain disorder, morbid obesity, mild stenosis of lumbar region s/p previous fusion (2015), and hypertension who is POD 0 s/p L2-L4 interbody and posterolateral fusion, minimally invasive.   Existing Precautions/Restrictions brace worn when out of bed;spinal  (No lifting more than 8 lb  for 2 weeks, or 15 lb for 2 months or 25 lb for 4 months or 35 lb for 6 months)   General Observations and Info patient was in bed and agreeable to OT session   Cognitive Status Examination   Orientation Status orientation to person, place and time   Visual Perception   Visual Impairment/Limitations WFL   Sensory   Sensory Quick Adds sensation intact   Pain Assessment   Patient Currently in Pain Yes, see Vital Sign flowsheet  (rating pain 5-6/10)   Posture   Posture not impaired   Range of Motion Comprehensive   General Range of Motion bilateral upper extremity ROM WFL   Muscle Tone Assessment   Muscle Tone Quick Adds No deficits were identified   Coordination   Upper Extremity Coordination No deficits were identified   Bed Mobility   Comment (Bed Mobility) SBA for bed mobility   Transfers   Transfer Comments CGA, fww sit<>stand   Balance   Balance Comments LOB was note noted, general unsteadiness to be expected   Activities of Daily Living   BADL Assessment/Intervention upper body dressing;lower body dressing;toileting;bathing   Bathing Assessment/Intervention   [place] Level (Bathing) moderate assist (50% patient effort)   Upper Body Dressing Assessment/Training   [place] Level (Upper Body Dressing) minimum assist (75% patient effort)   Lower Body Dressing Assessment/Training   [place] Level (Lower Body Dressing) moderate assist (50% patient effort)   Toileting   [place] Level (Toileting) moderate assist (50% patient effort)   Clinical Impression   Criteria for Skilled Therapeutic Interventions Met (OT) Yes, treatment indicated   OT Diagnosis decreased ADLs   OT Problem List-Impairments impacting ADL activity tolerance impaired;balance;post-surgical precautions;pain;strength;range of motion (ROM)   Assessment of Occupational Performance 5 or more Performance Deficits   Identified Performance Deficits dsg, toileting, bathing, functional/community mobility, household chores, errands   Planned  Therapy Interventions (OT) ADL retraining;progressive activity/exercise;transfer training   Clinical Decision Making Complexity (OT) low complexity   Anticipated Equipment Needs Upon Discharge (OT) dressing equipment;raised toilet seat;shower chair;toileting equipment   Risk & Benefits of therapy have been explained evaluation/treatment results reviewed;care plan/treatment goals reviewed;risks/benefits reviewed;participants voiced agreement with care plan;patient   OT Total Evaluation Time   OT Eval, Low Complexity Minutes (05223) 8   OT Goals   Therapy Frequency (OT) Daily   OT Predicted Duration/Target Date for Goal Attainment 11/24/22   OT Goals Upper Body Dressing;Lower Body Dressing;Toilet Transfer/Toileting;Transfers;OT Goal 1   OT: Upper Body Dressing Minimal assist;including orthotic;within precautions   OT: Lower Body Dressing Minimal assist;using adaptive equipment;within precautions  (goal met)   OT: Transfer with assistive device;within precautions  (CGA, tub transfer)   OT: Toilet Transfer/Toileting toilet transfer;using adaptive equipment;within precautions  (CGA)   OT: Goal 1 Patient will verbalize at least 2-3 adaptations to ADLs routines at home to accommodate energy level and safety needs.- goal met   OT Discharge Planning   OT Plan TB dressing, toilet and tub transfer   OT Discharge Recommendation (DC Rec) home with assist   OT Rationale for DC Rec Anticipate patient will meet needed goals for safe discharge home with family to A as needed with IADL's; household chores, driving, errands, cooking and bathing. Recommended AE; LHS, sockaid, shower chair, RTS, toileting aid,  patient has all other needed AE.   OT Brief overview of current status SBA for bed mobility, mod I with LE dressing with AE   Total Session Time   Total Session Time (sum of timed and untimed services) 8

## 2022-11-23 NOTE — CONSULTS
Care Management Discharge Note    Discharge Date: 11/24/2022       Discharge Disposition:  Home    Discharge Services:      Discharge DME:      Discharge Transportation: health plan transportation    Private pay costs discussed: Not applicable    PAS Confirmation Code:    Patient/family educated on Medicare website which has current facility and service quality ratings:      Education Provided on the Discharge Plan:    Persons Notified of Discharge Plans:   Patient/Family in Agreement with the Plan:      Handoff Referral Completed: No    Additional Information:    SW consulted for discharge planning. Met with pt at bedside to provide transportation resources. Pt explained that she has help at home upon discharge from her adult son and daughter. Pt insurance would be a barrier for homecare. Plan is for pt to discharge home with assist from children.     LUISITO Bautista, LYNDSEY  Inpatient Care Coordination  Ortho/Spine Unit  [phone]  Zaina Lowe, LYNDSEY

## 2022-11-23 NOTE — PLAN OF CARE
Goal Outcome Evaluation:                  Patient vital signs are at baseline: Yes  Patient able to ambulate as they were prior to admission or with assist devices provided by therapies during their stay:  Yes, a-1 with walker and gate belt.  Patient MUST void prior to discharge: no, pt has Kay catheter for retention.  Patient able to tolerate oral intake:  Yes  Pain has adequate pain control using Oral analgesics:  Yes  Does patient have an identified :  Yes  Has goal D/C date and time been discussed with patient:  Yes, expected discharge date 11/24/22.      Kay catheter  removed this morning @ 05:35.

## 2022-11-23 NOTE — PLAN OF CARE
Goal Outcome Evaluation:      Plan of Care Reviewed With: patient        Stable. Alert and oriented x 4. Assist of one. Dressing intact lumbar region. Ambulates in room with PT. Hemovac in place, its patent. BP fluctuating and on BP medication. Blood glucose manage with insulin and oral medication. Pain control  and manage with oral medication. Plain to discharge to home.

## 2022-11-24 ENCOUNTER — APPOINTMENT (OUTPATIENT)
Dept: PHYSICAL THERAPY | Facility: CLINIC | Age: 67
DRG: 454 | End: 2022-11-24
Attending: NEUROLOGICAL SURGERY
Payer: COMMERCIAL

## 2022-11-24 ENCOUNTER — APPOINTMENT (OUTPATIENT)
Dept: OCCUPATIONAL THERAPY | Facility: CLINIC | Age: 67
DRG: 454 | End: 2022-11-24
Attending: NEUROLOGICAL SURGERY
Payer: COMMERCIAL

## 2022-11-24 LAB
ANION GAP SERPL CALCULATED.3IONS-SCNC: 9 MMOL/L (ref 7–15)
BASOPHILS # BLD AUTO: 0 10E3/UL (ref 0–0.2)
BASOPHILS NFR BLD AUTO: 0 %
BUN SERPL-MCNC: 32.7 MG/DL (ref 8–23)
CALCIUM SERPL-MCNC: 8.5 MG/DL (ref 8.8–10.2)
CHLORIDE SERPL-SCNC: 99 MMOL/L (ref 98–107)
CREAT SERPL-MCNC: 1.77 MG/DL (ref 0.51–0.95)
DEPRECATED HCO3 PLAS-SCNC: 29 MMOL/L (ref 22–29)
EOSINOPHIL # BLD AUTO: 0.2 10E3/UL (ref 0–0.7)
EOSINOPHIL NFR BLD AUTO: 2 %
ERYTHROCYTE [DISTWIDTH] IN BLOOD BY AUTOMATED COUNT: 13.6 % (ref 10–15)
GFR SERPL CREATININE-BSD FRML MDRD: 31 ML/MIN/1.73M2
GLUCOSE BLDC GLUCOMTR-MCNC: 168 MG/DL (ref 70–99)
GLUCOSE BLDC GLUCOMTR-MCNC: 183 MG/DL (ref 70–99)
GLUCOSE BLDC GLUCOMTR-MCNC: 201 MG/DL (ref 70–99)
GLUCOSE BLDC GLUCOMTR-MCNC: 213 MG/DL (ref 70–99)
GLUCOSE BLDC GLUCOMTR-MCNC: 227 MG/DL (ref 70–99)
GLUCOSE SERPL-MCNC: 236 MG/DL (ref 70–99)
HCT VFR BLD AUTO: 24.3 % (ref 35–47)
HGB BLD-MCNC: 7.4 G/DL (ref 11.7–15.7)
IMM GRANULOCYTES # BLD: 0 10E3/UL
IMM GRANULOCYTES NFR BLD: 0 %
LYMPHOCYTES # BLD AUTO: 2.3 10E3/UL (ref 0.8–5.3)
LYMPHOCYTES NFR BLD AUTO: 24 %
MCH RBC QN AUTO: 25.9 PG (ref 26.5–33)
MCHC RBC AUTO-ENTMCNC: 30.5 G/DL (ref 31.5–36.5)
MCV RBC AUTO: 85 FL (ref 78–100)
MONOCYTES # BLD AUTO: 0.7 10E3/UL (ref 0–1.3)
MONOCYTES NFR BLD AUTO: 7 %
NEUTROPHILS # BLD AUTO: 6.3 10E3/UL (ref 1.6–8.3)
NEUTROPHILS NFR BLD AUTO: 67 %
NRBC # BLD AUTO: 0 10E3/UL
NRBC BLD AUTO-RTO: 0 /100
PLATELET # BLD AUTO: 127 10E3/UL (ref 150–450)
POTASSIUM SERPL-SCNC: 3.6 MMOL/L (ref 3.4–5.3)
RBC # BLD AUTO: 2.86 10E6/UL (ref 3.8–5.2)
SODIUM SERPL-SCNC: 137 MMOL/L (ref 136–145)
WBC # BLD AUTO: 9.6 10E3/UL (ref 4–11)

## 2022-11-24 PROCEDURE — 250N000013 HC RX MED GY IP 250 OP 250 PS 637: Performed by: NEUROLOGICAL SURGERY

## 2022-11-24 PROCEDURE — 97535 SELF CARE MNGMENT TRAINING: CPT | Mod: GO | Performed by: OCCUPATIONAL THERAPIST

## 2022-11-24 PROCEDURE — 250N000011 HC RX IP 250 OP 636: Performed by: NEUROLOGICAL SURGERY

## 2022-11-24 PROCEDURE — 250N000013 HC RX MED GY IP 250 OP 250 PS 637: Performed by: PHYSICIAN ASSISTANT

## 2022-11-24 PROCEDURE — 250N000013 HC RX MED GY IP 250 OP 250 PS 637: Performed by: INTERNAL MEDICINE

## 2022-11-24 PROCEDURE — 97530 THERAPEUTIC ACTIVITIES: CPT | Mod: GP | Performed by: PHYSICAL THERAPIST

## 2022-11-24 PROCEDURE — 36415 COLL VENOUS BLD VENIPUNCTURE: CPT | Performed by: NEUROLOGICAL SURGERY

## 2022-11-24 PROCEDURE — 85025 COMPLETE CBC W/AUTO DIFF WBC: CPT | Performed by: NEUROLOGICAL SURGERY

## 2022-11-24 PROCEDURE — 258N000003 HC RX IP 258 OP 636: Performed by: INTERNAL MEDICINE

## 2022-11-24 PROCEDURE — 97116 GAIT TRAINING THERAPY: CPT | Mod: GP | Performed by: PHYSICAL THERAPIST

## 2022-11-24 PROCEDURE — 99232 SBSQ HOSP IP/OBS MODERATE 35: CPT | Performed by: INTERNAL MEDICINE

## 2022-11-24 PROCEDURE — 250N000013 HC RX MED GY IP 250 OP 250 PS 637: Performed by: NURSE PRACTITIONER

## 2022-11-24 PROCEDURE — 120N000001 HC R&B MED SURG/OB

## 2022-11-24 PROCEDURE — 80048 BASIC METABOLIC PNL TOTAL CA: CPT | Performed by: NEUROLOGICAL SURGERY

## 2022-11-24 RX ORDER — METFORMIN HCL 500 MG
500 TABLET, EXTENDED RELEASE 24 HR ORAL
Status: DISCONTINUED | OUTPATIENT
Start: 2022-11-24 | End: 2022-11-25 | Stop reason: HOSPADM

## 2022-11-24 RX ADMIN — METOPROLOL SUCCINATE 100 MG: 100 TABLET, EXTENDED RELEASE ORAL at 08:27

## 2022-11-24 RX ADMIN — METHOCARBAMOL 750 MG: 750 TABLET ORAL at 21:24

## 2022-11-24 RX ADMIN — ACETAMINOPHEN 975 MG: 325 TABLET, FILM COATED ORAL at 02:52

## 2022-11-24 RX ADMIN — BUSPIRONE HYDROCHLORIDE 10 MG: 10 TABLET ORAL at 14:03

## 2022-11-24 RX ADMIN — INSULIN ASPART 1 UNITS: 100 INJECTION, SOLUTION INTRAVENOUS; SUBCUTANEOUS at 08:18

## 2022-11-24 RX ADMIN — OXYCODONE HYDROCHLORIDE 10 MG: 5 TABLET ORAL at 02:52

## 2022-11-24 RX ADMIN — INSULIN ASPART 2 UNITS: 100 INJECTION, SOLUTION INTRAVENOUS; SUBCUTANEOUS at 12:34

## 2022-11-24 RX ADMIN — METFORMIN HYDROCHLORIDE 500 MG: 500 TABLET, EXTENDED RELEASE ORAL at 18:26

## 2022-11-24 RX ADMIN — ACETAMINOPHEN 975 MG: 325 TABLET, FILM COATED ORAL at 18:26

## 2022-11-24 RX ADMIN — ALLOPURINOL 100 MG: 100 TABLET ORAL at 08:27

## 2022-11-24 RX ADMIN — METOPROLOL SUCCINATE 100 MG: 100 TABLET, EXTENDED RELEASE ORAL at 21:28

## 2022-11-24 RX ADMIN — ACETAMINOPHEN 975 MG: 325 TABLET, FILM COATED ORAL at 11:22

## 2022-11-24 RX ADMIN — BUSPIRONE HYDROCHLORIDE 10 MG: 10 TABLET ORAL at 08:27

## 2022-11-24 RX ADMIN — HYDROXYZINE HYDROCHLORIDE 10 MG: 10 TABLET ORAL at 16:33

## 2022-11-24 RX ADMIN — ESCITALOPRAM OXALATE 10 MG: 10 TABLET ORAL at 08:27

## 2022-11-24 RX ADMIN — METHOCARBAMOL 750 MG: 750 TABLET ORAL at 08:25

## 2022-11-24 RX ADMIN — TRAZODONE HYDROCHLORIDE 200 MG: 100 TABLET ORAL at 21:24

## 2022-11-24 RX ADMIN — HYDROXYZINE HYDROCHLORIDE 10 MG: 10 TABLET ORAL at 22:52

## 2022-11-24 RX ADMIN — HYDROXYZINE HYDROCHLORIDE 10 MG: 10 TABLET ORAL at 05:32

## 2022-11-24 RX ADMIN — BUSPIRONE HYDROCHLORIDE 10 MG: 10 TABLET ORAL at 21:24

## 2022-11-24 RX ADMIN — METHOCARBAMOL 750 MG: 750 TABLET ORAL at 15:46

## 2022-11-24 RX ADMIN — LISINOPRIL 40 MG: 40 TABLET ORAL at 08:26

## 2022-11-24 RX ADMIN — POLYETHYLENE GLYCOL 3350 17 G: 17 POWDER, FOR SOLUTION ORAL at 08:27

## 2022-11-24 RX ADMIN — METHOCARBAMOL 750 MG: 750 TABLET ORAL at 12:35

## 2022-11-24 RX ADMIN — SODIUM CHLORIDE, POTASSIUM CHLORIDE, SODIUM LACTATE AND CALCIUM CHLORIDE 1000 ML: 600; 310; 30; 20 INJECTION, SOLUTION INTRAVENOUS at 14:39

## 2022-11-24 RX ADMIN — INSULIN ASPART 2 UNITS: 100 INJECTION, SOLUTION INTRAVENOUS; SUBCUTANEOUS at 18:26

## 2022-11-24 RX ADMIN — OXYCODONE HYDROCHLORIDE 10 MG: 5 TABLET ORAL at 16:33

## 2022-11-24 RX ADMIN — OXYCODONE HYDROCHLORIDE 10 MG: 5 TABLET ORAL at 21:19

## 2022-11-24 RX ADMIN — OXYCODONE HYDROCHLORIDE 10 MG: 5 TABLET ORAL at 08:26

## 2022-11-24 RX ADMIN — ATORVASTATIN CALCIUM 20 MG: 20 TABLET, FILM COATED ORAL at 21:24

## 2022-11-24 RX ADMIN — SENNOSIDES AND DOCUSATE SODIUM 1 TABLET: 50; 8.6 TABLET ORAL at 08:31

## 2022-11-24 RX ADMIN — KETOROLAC TROMETHAMINE 15 MG: 15 INJECTION, SOLUTION INTRAMUSCULAR; INTRAVENOUS at 05:32

## 2022-11-24 RX ADMIN — HYDROXYZINE HYDROCHLORIDE 10 MG: 10 TABLET ORAL at 11:22

## 2022-11-24 RX ADMIN — PREGABALIN 25 MG: 25 CAPSULE ORAL at 16:33

## 2022-11-24 RX ADMIN — OXYCODONE HYDROCHLORIDE 10 MG: 5 TABLET ORAL at 12:38

## 2022-11-24 ASSESSMENT — ACTIVITIES OF DAILY LIVING (ADL)
ADLS_ACUITY_SCORE: 21
ADLS_ACUITY_SCORE: 21
ADLS_ACUITY_SCORE: 22
ADLS_ACUITY_SCORE: 22
ADLS_ACUITY_SCORE: 21
ADLS_ACUITY_SCORE: 22
ADLS_ACUITY_SCORE: 22
ADLS_ACUITY_SCORE: 21
ADLS_ACUITY_SCORE: 21
ADLS_ACUITY_SCORE: 22

## 2022-11-24 NOTE — PROGRESS NOTES
DAILY PROGRESS NOTE    Juliet Granados is a 67 year old old female admitted on 11/22/2022  5:30 AM.    Subjective       Objective    AAOx3, PRETTY , f/c 4/4 full strength   Ambulating,     Hemoglobin   Date Value Ref Range Status   11/23/2022 8.8 (L) 11.7 - 15.7 g/dL Final   ]      Impression / Plan       Plan for today:    Patient doing well  Ambulate   PT/OT clearance if cleared discharge today or tomorrow

## 2022-11-24 NOTE — PROGRESS NOTES
"[facility]  Hospitalist Progress Note     Assessment & Plan     ASSESSMENT:    67F with hx of NIDDM Type II, HTN, CKD Stage III, and spinal stenosis w/ degenerative disc disease presents for scheduled fusion. Hospitalist service was consulted for medical mngt.    PLAN:    -Spinal Stenosis w/ Degenerative Disc Disease: S/p fusion. Post-op care per primary team    -NIDDM Type II: Home metformin and sliding scale coverage    -Essential HTN: Would hold amlodipine and clonidine for discharge (can resume Lisinopril, hydrochlorothiazide, metoprolol) until PCP f/u as BPs have been low-nl    -CKD Stage III: At baseline Cr    -DVT Prophy: Per primary team    -Disposition: Per primary team      Soren Cota MD    Subjective     Seen at bedside. BPs low-nl - endorses dizziness with ambulation but orthostatics negative.        Objective   Blood pressure 136/66, pulse 67, temperature 98.5  F (36.9  C), temperature source Temporal, resp. rate 20, height 1.664 m (5' 5.5\"), weight 92.6 kg (204 lb 1.6 oz), SpO2 97 %.    PHYSICAL EXAM  General: In no acute distress  CV: RRR.  Lungs: CTAB. Nl WOB.  Abd: Non-tender.  MSK: Post-op dressing in place over lumbar spine  Ext: No edema.    LABS AND IMAGING: Reviewed and pertinent results discussed in assessment and plan.   "

## 2022-11-24 NOTE — PLAN OF CARE
Goal Outcome Evaluation:      Plan of Care Reviewed With: patient    Overall Patient Progress: improving    Patient vital signs are at baseline: Yes  Patient able to ambulate as they were prior to admission or with assist devices provided by therapies during their stay:  Yes pt up with assist of 1 gait belt and walker  Patient MUST void prior to discharge:  Yes  Patient able to tolerate oral intake:  Yes  Pain has adequate pain control using Oral analgesics:  Yes  Does patient have an identified :  Yes  Has goal D/C date and time been discussed with patient:  Yes     Pt AOX4. Pain controlled with PRN oxycodone. Scheduled,  Toradol and hydroxyzine VSS RA. Pt up with assist of 1. Voiding good amounts. Hemovac removed.  CMS intact. Dried blood on the dressing. Dressing reinforced. Pt to discharge to home.

## 2022-11-25 ENCOUNTER — APPOINTMENT (OUTPATIENT)
Dept: OCCUPATIONAL THERAPY | Facility: CLINIC | Age: 67
DRG: 454 | End: 2022-11-25
Attending: NEUROLOGICAL SURGERY
Payer: COMMERCIAL

## 2022-11-25 ENCOUNTER — APPOINTMENT (OUTPATIENT)
Dept: PHYSICAL THERAPY | Facility: CLINIC | Age: 67
DRG: 454 | End: 2022-11-25
Attending: NEUROLOGICAL SURGERY
Payer: COMMERCIAL

## 2022-11-25 VITALS
WEIGHT: 204.1 LBS | SYSTOLIC BLOOD PRESSURE: 118 MMHG | HEART RATE: 77 BPM | OXYGEN SATURATION: 91 % | TEMPERATURE: 97.9 F | RESPIRATION RATE: 20 BRPM | BODY MASS INDEX: 32.8 KG/M2 | DIASTOLIC BLOOD PRESSURE: 63 MMHG | HEIGHT: 66 IN

## 2022-11-25 LAB
ANION GAP SERPL CALCULATED.3IONS-SCNC: 9 MMOL/L (ref 7–15)
BASOPHILS # BLD AUTO: 0 10E3/UL (ref 0–0.2)
BASOPHILS NFR BLD AUTO: 0 %
BUN SERPL-MCNC: 21.9 MG/DL (ref 8–23)
CALCIUM SERPL-MCNC: 8.9 MG/DL (ref 8.8–10.2)
CHLORIDE SERPL-SCNC: 103 MMOL/L (ref 98–107)
CREAT SERPL-MCNC: 1.38 MG/DL (ref 0.51–0.95)
DEPRECATED HCO3 PLAS-SCNC: 28 MMOL/L (ref 22–29)
EOSINOPHIL # BLD AUTO: 0.2 10E3/UL (ref 0–0.7)
EOSINOPHIL NFR BLD AUTO: 3 %
ERYTHROCYTE [DISTWIDTH] IN BLOOD BY AUTOMATED COUNT: 13.2 % (ref 10–15)
GFR SERPL CREATININE-BSD FRML MDRD: 42 ML/MIN/1.73M2
GLUCOSE BLDC GLUCOMTR-MCNC: 155 MG/DL (ref 70–99)
GLUCOSE BLDC GLUCOMTR-MCNC: 216 MG/DL (ref 70–99)
GLUCOSE SERPL-MCNC: 213 MG/DL (ref 70–99)
HCT VFR BLD AUTO: 24 % (ref 35–47)
HGB BLD-MCNC: 7.5 G/DL (ref 11.7–15.7)
IMM GRANULOCYTES # BLD: 0.1 10E3/UL
IMM GRANULOCYTES NFR BLD: 1 %
LYMPHOCYTES # BLD AUTO: 1.7 10E3/UL (ref 0.8–5.3)
LYMPHOCYTES NFR BLD AUTO: 20 %
MCH RBC QN AUTO: 25.9 PG (ref 26.5–33)
MCHC RBC AUTO-ENTMCNC: 31.3 G/DL (ref 31.5–36.5)
MCV RBC AUTO: 83 FL (ref 78–100)
MONOCYTES # BLD AUTO: 0.6 10E3/UL (ref 0–1.3)
MONOCYTES NFR BLD AUTO: 7 %
NEUTROPHILS # BLD AUTO: 5.9 10E3/UL (ref 1.6–8.3)
NEUTROPHILS NFR BLD AUTO: 69 %
NRBC # BLD AUTO: 0 10E3/UL
NRBC BLD AUTO-RTO: 0 /100
PLATELET # BLD AUTO: 141 10E3/UL (ref 150–450)
POTASSIUM SERPL-SCNC: 3.7 MMOL/L (ref 3.4–5.3)
RBC # BLD AUTO: 2.9 10E6/UL (ref 3.8–5.2)
SODIUM SERPL-SCNC: 140 MMOL/L (ref 136–145)
WBC # BLD AUTO: 8.6 10E3/UL (ref 4–11)

## 2022-11-25 PROCEDURE — 99232 SBSQ HOSP IP/OBS MODERATE 35: CPT | Performed by: INTERNAL MEDICINE

## 2022-11-25 PROCEDURE — 80048 BASIC METABOLIC PNL TOTAL CA: CPT | Performed by: NEUROLOGICAL SURGERY

## 2022-11-25 PROCEDURE — 85025 COMPLETE CBC W/AUTO DIFF WBC: CPT | Performed by: NEUROLOGICAL SURGERY

## 2022-11-25 PROCEDURE — 250N000013 HC RX MED GY IP 250 OP 250 PS 637: Performed by: NEUROLOGICAL SURGERY

## 2022-11-25 PROCEDURE — 97535 SELF CARE MNGMENT TRAINING: CPT | Mod: GO

## 2022-11-25 PROCEDURE — 36415 COLL VENOUS BLD VENIPUNCTURE: CPT | Performed by: NEUROLOGICAL SURGERY

## 2022-11-25 PROCEDURE — 97530 THERAPEUTIC ACTIVITIES: CPT | Mod: GP | Performed by: PHYSICAL THERAPIST

## 2022-11-25 PROCEDURE — 250N000013 HC RX MED GY IP 250 OP 250 PS 637: Performed by: PHYSICIAN ASSISTANT

## 2022-11-25 PROCEDURE — 250N000013 HC RX MED GY IP 250 OP 250 PS 637: Performed by: NURSE PRACTITIONER

## 2022-11-25 PROCEDURE — 97116 GAIT TRAINING THERAPY: CPT | Mod: GP | Performed by: PHYSICAL THERAPIST

## 2022-11-25 RX ADMIN — BUSPIRONE HYDROCHLORIDE 10 MG: 10 TABLET ORAL at 09:00

## 2022-11-25 RX ADMIN — ESCITALOPRAM OXALATE 10 MG: 10 TABLET ORAL at 08:59

## 2022-11-25 RX ADMIN — ALLOPURINOL 100 MG: 100 TABLET ORAL at 09:00

## 2022-11-25 RX ADMIN — SENNOSIDES AND DOCUSATE SODIUM 1 TABLET: 50; 8.6 TABLET ORAL at 09:00

## 2022-11-25 RX ADMIN — OXYCODONE HYDROCHLORIDE 10 MG: 5 TABLET ORAL at 03:04

## 2022-11-25 RX ADMIN — INSULIN ASPART 2 UNITS: 100 INJECTION, SOLUTION INTRAVENOUS; SUBCUTANEOUS at 11:13

## 2022-11-25 RX ADMIN — OXYCODONE HYDROCHLORIDE 10 MG: 5 TABLET ORAL at 13:29

## 2022-11-25 RX ADMIN — ACETAMINOPHEN 975 MG: 325 TABLET, FILM COATED ORAL at 03:04

## 2022-11-25 RX ADMIN — METHOCARBAMOL 750 MG: 750 TABLET ORAL at 13:30

## 2022-11-25 RX ADMIN — BUSPIRONE HYDROCHLORIDE 10 MG: 10 TABLET ORAL at 13:29

## 2022-11-25 RX ADMIN — POLYETHYLENE GLYCOL 3350 17 G: 17 POWDER, FOR SOLUTION ORAL at 08:59

## 2022-11-25 RX ADMIN — HYDROXYZINE HYDROCHLORIDE 10 MG: 10 TABLET ORAL at 11:13

## 2022-11-25 RX ADMIN — HYDROXYZINE HYDROCHLORIDE 10 MG: 10 TABLET ORAL at 04:04

## 2022-11-25 RX ADMIN — OXYCODONE HYDROCHLORIDE 10 MG: 5 TABLET ORAL at 08:59

## 2022-11-25 RX ADMIN — METHOCARBAMOL 750 MG: 750 TABLET ORAL at 09:00

## 2022-11-25 RX ADMIN — METOPROLOL SUCCINATE 100 MG: 100 TABLET, EXTENDED RELEASE ORAL at 09:00

## 2022-11-25 ASSESSMENT — ACTIVITIES OF DAILY LIVING (ADL)
ADLS_ACUITY_SCORE: 21

## 2022-11-25 NOTE — PLAN OF CARE
Goal Outcome Evaluation:      Plan of Care Reviewed With: patient    Overall Patient Progress: improving    Patient vital signs are at baseline: Yes  Patient able to ambulate as they were prior to admission or with assist devices provided by therapies during their stay:  Yes pt up with assist of 1 gait belt and walker. Back brace.   Patient MUST void prior to discharge:  Yes  Patient able to tolerate oral intake:  Yes  Pain has adequate pain control using Oral analgesics:  Yes  Does patient have an identified :  Yes  Has goal D/C date and time been discussed with patient:  Yes      Pt AOX4. Pain controlled with PRN oxycodone. Scheduled,  hydroxyzine and tylenol. VSS RA. Pt up with assist of 1. Voiding good amounts.CMS intact. Pt to discharge to home.

## 2022-11-25 NOTE — PROGRESS NOTES
"[facility]  Hospitalist Progress Note     Assessment & Plan     ASSESSMENT:    67F with hx of NIDDM Type II, HTN, CKD Stage III, and spinal stenosis w/ degenerative disc disease presents for scheduled fusion. Hospitalist service was consulted for medical mngt.    PLAN:    -Spinal Stenosis w/ Degenerative Disc Disease: S/p fusion. Post-op care per primary team    -NIDDM Type II: Home metformin and sliding scale coverage    -Essential HTN: Discontinue amlodipine, clonidine, and hydrochlorothiazide for discharge (completed in med rec) until PCP f/u as BPs have been low-nl    -CKD Stage III: At baseline Cr    -DVT Prophy: Per primary team    -Disposition: Per primary team      Soren Cota MD    Subjective     Patient seen at bedside. Pain better controlled. Normotensive despite holding most antihypertensives.        Objective   Blood pressure 118/63, pulse 77, temperature 97.9  F (36.6  C), temperature source Temporal, resp. rate 20, height 1.664 m (5' 5.5\"), weight 92.6 kg (204 lb 1.6 oz), SpO2 91 %.    PHYSICAL EXAM  General: In no acute distress  CV: RRR.  Lungs: CTAB. Nl WOB.  Abd: Non-tender.  MSK: Post-op dressing in place over lumbar spine  Ext: No edema.    LABS AND IMAGING: Reviewed and pertinent results discussed in assessment and plan.   "

## 2022-11-25 NOTE — PLAN OF CARE
Goal Outcome Evaluation:       Pt up A1 with brace, walker, gaitbelt. A couple incontinent stools. Reg diet. Pain managed. Family at bedside. All discharge education completed. All belongings collected and sent home with pt. AVS signed. Adequate for discharge. Family her to transport.

## 2022-11-25 NOTE — PLAN OF CARE
Physical Therapy Discharge Summary    Reason for therapy discharge:    Discharged to home with home therapy.    Progress towards therapy goal(s). See goals on Care Plan in [facility] electronic health record for goal details.  Goals partially met.  Barriers to achieving goals:   discharge from facility.    Therapy recommendation(s):    Continued therapy is recommended.  Rationale/Recommendations:  recommend continued HHPT to progress towards independence with functional mobility, issued FWW for safe ambulation at home.

## 2022-11-25 NOTE — PLAN OF CARE
Occupational Therapy Discharge Summary    Reason for therapy discharge:    All goals and outcomes met, no further needs identified.    Progress towards therapy goal(s). See goals on Care Plan in [facility] electronic health record for goal details.  Goals met    Therapy recommendation(s):    No further therapy is recommended.

## 2022-11-25 NOTE — PLAN OF CARE
Goal Outcome Evaluation:      Plan of Care Reviewed With: patient    Overall Patient Progress: improving    Pt A&O x4. VS stable; afebrile. PO oxycodone and scheduled robaxin, atarax, and tylenol managing pain. CMS intact. Dressing changed per MD. Up w/ Ax1 using gait belt, and walker. Brace on when OOB. Voiding in good amts. Tolerating regular diet. Plan is to discharge to home tomorrow.

## 2022-11-27 ENCOUNTER — HEALTH MAINTENANCE LETTER (OUTPATIENT)
Age: 67
End: 2022-11-27

## 2022-12-07 ENCOUNTER — DOCUMENTATION ONLY (OUTPATIENT)
Facility: CLINIC | Age: 67
End: 2022-12-07

## 2022-12-07 DIAGNOSIS — Z98.1 S/P LUMBAR FUSION: ICD-10-CM

## 2022-12-07 DIAGNOSIS — R26.89 IMPAIRED GAIT AND MOBILITY: Primary | ICD-10-CM

## 2022-12-07 DIAGNOSIS — M43.16 SPONDYLOLISTHESIS OF LUMBAR REGION: ICD-10-CM

## 2022-12-27 ENCOUNTER — DOCUMENTATION ONLY (OUTPATIENT)
Facility: CLINIC | Age: 67
End: 2022-12-27

## 2022-12-27 DIAGNOSIS — M43.16 SPONDYLOLISTHESIS OF LUMBAR REGION: ICD-10-CM

## 2022-12-27 DIAGNOSIS — R26.89 IMPAIRED GAIT AND MOBILITY: Primary | ICD-10-CM

## 2022-12-27 DIAGNOSIS — Z98.1 ARTHRODESIS STATUS: ICD-10-CM

## 2024-01-07 ENCOUNTER — HEALTH MAINTENANCE LETTER (OUTPATIENT)
Age: 69
End: 2024-01-07

## 2024-12-21 ENCOUNTER — HEALTH MAINTENANCE LETTER (OUTPATIENT)
Age: 69
End: 2024-12-21

## 2025-01-25 ENCOUNTER — HEALTH MAINTENANCE LETTER (OUTPATIENT)
Age: 70
End: 2025-01-25

## (undated) DEVICE — DRSG STERI STRIP 1/2X4" R1547

## (undated) DEVICE — DRAPE MAYO STAND 23X54 8337

## (undated) DEVICE — SYR 30ML LL W/O NDL 302832

## (undated) DEVICE — CUSHION INSERT LG PRONE VIEW JACKSON TABLE

## (undated) DEVICE — SUCTION MANIFOLD NEPTUNE 2 SYS 4 PORT 0702-020-000

## (undated) DEVICE — CATH TRAY FOLEY SURESTEP 16FR W/URINE MTR STATLK LF A303416A

## (undated) DEVICE — ESU GROUND PAD ADULT W/CORD E7507

## (undated) DEVICE — DRAPE C-ARM 60X42" 1013

## (undated) DEVICE — PAD PROAXIS TABLE KIT SPK10182

## (undated) DEVICE — SU VICRYL 2-0 CT-2 CR 8X18" J726D

## (undated) DEVICE — LINEN ORTHO ACL PACK 5447

## (undated) DEVICE — RX SURGIFLO HEMOSTATIC MATRIX 8ML 2991

## (undated) DEVICE — CATH TRAY FOLEY SURESTEP 16FR W/URNE MTR STLK LATEX A303316A

## (undated) DEVICE — DRSG GAUZE 4X4" TRAY

## (undated) DEVICE — ESU ELEC BLADE 4" COATED

## (undated) DEVICE — Device

## (undated) DEVICE — PACK SMALL SPINE RIDGES

## (undated) DEVICE — SPONGE RAY-TEC 4X8" 7318

## (undated) DEVICE — DRSG ABDOMINAL 07 1/2X8" 7197D

## (undated) DEVICE — SYR 03ML LL W/O NDL 309657

## (undated) DEVICE — DECANTER BAG 2002S

## (undated) DEVICE — SYR 10ML LL W/O NDL 302995

## (undated) DEVICE — BAG CLEAR TRASH 1.3M 39X33" P4040C

## (undated) DEVICE — GLOVE BIOGEL PI ULTRATOUCH SZ 8.0 41180

## (undated) DEVICE — K-WIRES

## (undated) DEVICE — IOM FLAT FEE

## (undated) DEVICE — PREP DURAPREP 26ML APL 8630

## (undated) DEVICE — DRAPE IOBAN ISOLATION VERTICAL 6619

## (undated) DEVICE — DRAIN HEMOVAC RESERVOIR KIT 10FR 1/8" MED 00-2550-002-10

## (undated) DEVICE — FLEXIBLE CURETTE BLADE

## (undated) RX ORDER — CEFAZOLIN SODIUM/WATER 2 G/20 ML
SYRINGE (ML) INTRAVENOUS
Status: DISPENSED
Start: 2022-11-22

## (undated) RX ORDER — LIDOCAINE HYDROCHLORIDE 10 MG/ML
INJECTION, SOLUTION EPIDURAL; INFILTRATION; INTRACAUDAL; PERINEURAL
Status: DISPENSED
Start: 2022-11-22

## (undated) RX ORDER — FENTANYL CITRATE 50 UG/ML
INJECTION, SOLUTION INTRAMUSCULAR; INTRAVENOUS
Status: DISPENSED
Start: 2022-11-22

## (undated) RX ORDER — TRIAMCINOLONE ACETONIDE 40 MG/ML
INJECTION, SUSPENSION INTRA-ARTICULAR; INTRAMUSCULAR
Status: DISPENSED
Start: 2022-11-22

## (undated) RX ORDER — HYDROMORPHONE HCL IN WATER/PF 6 MG/30 ML
PATIENT CONTROLLED ANALGESIA SYRINGE INTRAVENOUS
Status: DISPENSED
Start: 2022-11-22

## (undated) RX ORDER — GINSENG 100 MG
CAPSULE ORAL
Status: DISPENSED
Start: 2022-11-22

## (undated) RX ORDER — HYDRALAZINE HYDROCHLORIDE 20 MG/ML
INJECTION INTRAMUSCULAR; INTRAVENOUS
Status: DISPENSED
Start: 2022-11-22

## (undated) RX ORDER — FENTANYL CITRATE-0.9 % NACL/PF 10 MCG/ML
PLASTIC BAG, INJECTION (ML) INTRAVENOUS
Status: DISPENSED
Start: 2022-11-22

## (undated) RX ORDER — PROPOFOL 10 MG/ML
INJECTION, EMULSION INTRAVENOUS
Status: DISPENSED
Start: 2022-11-22

## (undated) RX ORDER — GABAPENTIN 100 MG/1
CAPSULE ORAL
Status: DISPENSED
Start: 2022-11-22

## (undated) RX ORDER — ONDANSETRON 2 MG/ML
INJECTION INTRAMUSCULAR; INTRAVENOUS
Status: DISPENSED
Start: 2022-11-22

## (undated) RX ORDER — TRANEXAMIC ACID 10 MG/ML
INJECTION, SOLUTION INTRAVENOUS
Status: DISPENSED
Start: 2022-11-22

## (undated) RX ORDER — METOCLOPRAMIDE HYDROCHLORIDE 5 MG/ML
INJECTION INTRAMUSCULAR; INTRAVENOUS
Status: DISPENSED
Start: 2022-11-22

## (undated) RX ORDER — DEXAMETHASONE SODIUM PHOSPHATE 4 MG/ML
INJECTION, SOLUTION INTRA-ARTICULAR; INTRALESIONAL; INTRAMUSCULAR; INTRAVENOUS; SOFT TISSUE
Status: DISPENSED
Start: 2022-11-22

## (undated) RX ORDER — BUPIVACAINE HYDROCHLORIDE 7.5 MG/ML
INJECTION, SOLUTION EPIDURAL; RETROBULBAR
Status: DISPENSED
Start: 2022-11-22

## (undated) RX ORDER — GLYCOPYRROLATE 0.2 MG/ML
INJECTION INTRAMUSCULAR; INTRAVENOUS
Status: DISPENSED
Start: 2022-11-22

## (undated) RX ORDER — BUPIVACAINE HYDROCHLORIDE 2.5 MG/ML
INJECTION, SOLUTION EPIDURAL; INFILTRATION; INTRACAUDAL
Status: DISPENSED
Start: 2022-11-22

## (undated) RX ORDER — DEXMEDETOMIDINE HYDROCHLORIDE 4 UG/ML
INJECTION, SOLUTION INTRAVENOUS
Status: DISPENSED
Start: 2022-11-22

## (undated) RX ORDER — HYDROXYZINE HYDROCHLORIDE 10 MG/1
TABLET, FILM COATED ORAL
Status: DISPENSED
Start: 2022-11-22

## (undated) RX ORDER — LIDOCAINE HYDROCHLORIDE AND EPINEPHRINE 10; 10 MG/ML; UG/ML
INJECTION, SOLUTION INFILTRATION; PERINEURAL
Status: DISPENSED
Start: 2022-11-22

## (undated) RX ORDER — ACETAMINOPHEN 325 MG/1
TABLET ORAL
Status: DISPENSED
Start: 2022-11-22

## (undated) RX ORDER — KETOROLAC TROMETHAMINE 30 MG/ML
INJECTION, SOLUTION INTRAMUSCULAR; INTRAVENOUS
Status: DISPENSED
Start: 2022-11-22

## (undated) RX ORDER — OXYCODONE HYDROCHLORIDE 5 MG/1
TABLET ORAL
Status: DISPENSED
Start: 2022-11-22